# Patient Record
Sex: MALE | Race: WHITE | URBAN - METROPOLITAN AREA
[De-identification: names, ages, dates, MRNs, and addresses within clinical notes are randomized per-mention and may not be internally consistent; named-entity substitution may affect disease eponyms.]

---

## 2020-07-16 ENCOUNTER — OUTSIDE SERVICES (OUTPATIENT)
Dept: NEUROLOGY | Age: 60
End: 2020-07-16
Payer: COMMERCIAL

## 2020-07-16 PROCEDURE — 95913 NRV CNDJ TEST 13/> STUDIES: CPT | Performed by: PSYCHIATRY & NEUROLOGY

## 2020-07-16 PROCEDURE — 95886 MUSC TEST DONE W/N TEST COMP: CPT | Performed by: PSYCHIATRY & NEUROLOGY

## 2023-01-04 ENCOUNTER — APPOINTMENT (OUTPATIENT)
Dept: GENERAL RADIOLOGY | Age: 63
End: 2023-01-04
Payer: COMMERCIAL

## 2023-01-04 ENCOUNTER — HOSPITAL ENCOUNTER (EMERGENCY)
Age: 63
Discharge: HOME | End: 2023-01-04
Payer: COMMERCIAL

## 2023-01-04 VITALS
RESPIRATION RATE: 18 BRPM | DIASTOLIC BLOOD PRESSURE: 78 MMHG | SYSTOLIC BLOOD PRESSURE: 145 MMHG | HEART RATE: 84 BPM | HEIGHT: 73 IN | OXYGEN SATURATION: 98 % | WEIGHT: 265 LBS | TEMPERATURE: 97.9 F | BODY MASS INDEX: 35.12 KG/M2

## 2023-01-04 DIAGNOSIS — S86.819A STRAIN OF CALF MUSCLE, INITIAL ENCOUNTER: Primary | ICD-10-CM

## 2023-01-04 PROCEDURE — 73560 X-RAY EXAM OF KNEE 1 OR 2: CPT

## 2023-01-04 PROCEDURE — 99283 EMERGENCY DEPT VISIT LOW MDM: CPT

## 2023-01-04 PROCEDURE — 73590 X-RAY EXAM OF LOWER LEG: CPT

## 2023-01-04 ASSESSMENT — PAIN - FUNCTIONAL ASSESSMENT
PAIN_FUNCTIONAL_ASSESSMENT: 0-10
PAIN_FUNCTIONAL_ASSESSMENT: 0-10

## 2023-01-04 ASSESSMENT — ENCOUNTER SYMPTOMS
COUGH: 0
NAUSEA: 0
DIARRHEA: 0
SHORTNESS OF BREATH: 0
VOMITING: 0
ABDOMINAL PAIN: 0
BACK PAIN: 0

## 2023-01-04 ASSESSMENT — PAIN DESCRIPTION - PAIN TYPE: TYPE: ACUTE PAIN

## 2023-01-04 ASSESSMENT — PAIN DESCRIPTION - DESCRIPTORS: DESCRIPTORS: SHARP

## 2023-01-04 ASSESSMENT — PAIN SCALES - GENERAL
PAINLEVEL_OUTOF10: 6
PAINLEVEL_OUTOF10: 6

## 2023-01-04 ASSESSMENT — PAIN DESCRIPTION - ORIENTATION: ORIENTATION: LEFT

## 2023-01-04 ASSESSMENT — PAIN DESCRIPTION - FREQUENCY: FREQUENCY: CONTINUOUS

## 2023-01-04 ASSESSMENT — PAIN DESCRIPTION - LOCATION: LOCATION: LEG

## 2023-01-05 NOTE — ED PROVIDER NOTES
[address] [facility] ED  eMERGENCY dEPARTMENT eNCOUnter      Pt Name: Lauren Parks  MRN: [id number]  [place] 1960  Date of evaluation: 1/4/2023  Provider: ERIC Mantilla CNP      HISTORY OF PRESENT ILLNESS    Lauren Parks is a 60 y.o. male who presents to the Emergency Department with L calf pain x 1 day ago. He was pushing a very heavy pallet and felt a \"pop\" in the back of the L calf causing him to fall onto the L knee. Pain is moderate. Pain is worse with movement. Patient is able to ambulate with pain. REVIEW OF SYSTEMS       Review of Systems   Constitutional:  Negative for activity change, appetite change and fever. HENT:  Negative for congestion. Respiratory:  Negative for cough and shortness of breath. Cardiovascular:  Negative for chest pain. Gastrointestinal:  Negative for abdominal pain, diarrhea, nausea and vomiting. Genitourinary:  Negative for dysuria. Musculoskeletal:  Negative for arthralgias and back pain. L calf pain   Skin:  Negative for rash. All other systems reviewed and are negative. PAST MEDICAL HISTORY   No past medical history on file. SURGICAL HISTORY     No past surgical history on file. CURRENT MEDICATIONS       Previous Medications    No medications on file       ALLERGIES     Nsaids    FAMILY HISTORY     No family history on file. SOCIAL HISTORY       Social History     Socioeconomic History    Marital status:        SCREENINGS    Delisa Coma Scale  Eye Opening: Spontaneous  Best Verbal Response: Oriented  Best Motor Response: Obeys commands  Delisa Coma Scale Score: 15 @FLOW([id number])@      PHYSICAL EXAM    (up to 7 for level 4, 8 or more for level 5)     ED Triage Vitals [01/04/23 1902]   BP Temp Temp Source Heart Rate Resp SpO2 Height Weight   (!) 164/91 97.9 °F (36.6 °C) Oral 89 18 98 % 6' 1\" (1.854 m) 265 lb (120.2 kg)       Physical Exam  Vitals and nursing note reviewed.    Constitutional: Appearance: He is well-developed. HENT:      Head: Normocephalic and atraumatic. Right Ear: Hearing, tympanic membrane, ear canal and external ear normal.      Left Ear: Hearing, tympanic membrane, ear canal and external ear normal.      Nose: Nose normal.      Mouth/Throat:      Lips: Pink. Mouth: Mucous membranes are moist.   Eyes:      Conjunctiva/sclera: Conjunctivae normal.      Pupils: Pupils are equal, round, and reactive to light. Cardiovascular:      Rate and Rhythm: Normal rate and regular rhythm. Heart sounds: Normal heart sounds. Pulmonary:      Effort: Pulmonary effort is normal. No accessory muscle usage or respiratory distress. Breath sounds: Normal breath sounds. No decreased air movement. No decreased breath sounds, wheezing or rhonchi. Abdominal:      General: Bowel sounds are normal. There is no distension. Palpations: Abdomen is soft. Tenderness: There is no abdominal tenderness. Musculoskeletal:         General: Normal range of motion. Cervical back: Normal range of motion and neck supple. Left lower leg: Tenderness present. No swelling, deformity, lacerations or bony tenderness. No edema. Left ankle:      Left Achilles Tendon: Normal. No tenderness or defects. Bennett's test negative. Legs:    Skin:     General: Skin is warm and dry. Neurological:      General: No focal deficit present. Mental Status: He is alert and oriented to person, place, and time. GCS: GCS eye subscore is 4. GCS verbal subscore is 5. GCS motor subscore is 6. Deep Tendon Reflexes: Reflexes are normal and symmetric. Psychiatric:         Judgment: Judgment normal.         All other labs were within normal range or not returned as of this dictation.     EMERGENCY DEPARTMENT COURSE and DIFFERENTIALDIAGNOSIS/MDM:   Vitals:    Vitals:    01/04/23 1902   BP: (!) 164/91   Pulse: 89   Resp: 18   Temp: 97.9 °F (36.6 °C)   TempSrc: Oral   SpO2: 98% Weight: 265 lb (120.2 kg)   Height: 6' 1\" (1.854 m)            60 yr old male with calf strain. F/U  with occupational health and ortho in 1-2 days. Patient had walking boot applied for comfort during waling. Patient verbalizes understanding. PROCEDURES:  Unless otherwise noted below, none     Procedures      FINAL IMPRESSION      1.  Strain of calf muscle, initial encounter          DISPOSITION/PLAN   DISPOSITION Decision To Discharge 01/04/2023 08:05:47 PM          ERIC Richardson CNP (electronically signed)  Attending Emergency Physician      ERIC Richardson CNP  01/04/23 2007

## 2023-03-15 ENCOUNTER — TELEPHONE (OUTPATIENT)
Dept: PRIMARY CARE | Facility: CLINIC | Age: 63
End: 2023-03-15
Payer: COMMERCIAL

## 2023-03-15 RX ORDER — TAMSULOSIN HYDROCHLORIDE 0.4 MG/1
0.4 CAPSULE ORAL NIGHTLY
COMMUNITY
End: 2024-05-03

## 2023-03-15 RX ORDER — ATORVASTATIN CALCIUM 80 MG/1
80 TABLET, FILM COATED ORAL DAILY
COMMUNITY
End: 2023-11-22 | Stop reason: SDUPTHER

## 2023-03-15 RX ORDER — METFORMIN HYDROCHLORIDE 500 MG/1
500 TABLET, EXTENDED RELEASE ORAL DAILY
COMMUNITY
End: 2023-11-24

## 2023-03-15 RX ORDER — LANOLIN ALCOHOL/MO/W.PET/CERES
1 CREAM (GRAM) TOPICAL DAILY
COMMUNITY

## 2023-03-15 RX ORDER — SILDENAFIL CITRATE 20 MG/1
TABLET ORAL
COMMUNITY

## 2023-03-15 RX ORDER — CHOLECALCIFEROL (VITAMIN D3) 25 MCG
2000 TABLET ORAL DAILY
COMMUNITY
Start: 2020-02-26

## 2023-03-15 RX ORDER — VERAPAMIL HYDROCHLORIDE 120 MG/1
120 TABLET, FILM COATED, EXTENDED RELEASE ORAL
COMMUNITY
End: 2024-01-08 | Stop reason: SDUPTHER

## 2023-03-15 RX ORDER — SEMAGLUTIDE 1.34 MG/ML
0.5 INJECTION, SOLUTION SUBCUTANEOUS
COMMUNITY
Start: 2019-02-08 | End: 2024-01-11 | Stop reason: SDUPTHER

## 2023-03-15 NOTE — TELEPHONE ENCOUNTER
Steven from [facility] pharmacy called and stated they do not have ozempic 2mg/1.5 mL. They want to replace it for ozempic 2mg/3mL please advise

## 2023-03-17 LAB
ACTIVATED PARTIAL THROMBOPLASTIN TIME IN PPP BY COAGULATION ASSAY: 31 SEC (ref 26–39)
ALANINE AMINOTRANSFERASE (SGPT) (U/L) IN SER/PLAS: 15 U/L (ref 10–52)
ALBUMIN (G/DL) IN SER/PLAS: 4.2 G/DL (ref 3.4–5)
ALKALINE PHOSPHATASE (U/L) IN SER/PLAS: 92 U/L (ref 33–136)
ANION GAP IN SER/PLAS: 13 MMOL/L (ref 10–20)
ASPARTATE AMINOTRANSFERASE (SGOT) (U/L) IN SER/PLAS: 18 U/L (ref 9–39)
BASOPHILS (10*3/UL) IN BLOOD BY AUTOMATED COUNT: 0.05 X10E9/L (ref 0–0.1)
BASOPHILS/100 LEUKOCYTES IN BLOOD BY AUTOMATED COUNT: 0.8 % (ref 0–2)
BILIRUBIN TOTAL (MG/DL) IN SER/PLAS: 1.1 MG/DL (ref 0–1.2)
CALCIUM (MG/DL) IN SER/PLAS: 9.1 MG/DL (ref 8.6–10.3)
CARBON DIOXIDE, TOTAL (MMOL/L) IN SER/PLAS: 24 MMOL/L (ref 21–32)
CHLORIDE (MMOL/L) IN SER/PLAS: 104 MMOL/L (ref 98–107)
CREATININE (MG/DL) IN SER/PLAS: 1.03 MG/DL (ref 0.5–1.3)
EOSINOPHILS (10*3/UL) IN BLOOD BY AUTOMATED COUNT: 0.11 X10E9/L (ref 0–0.7)
EOSINOPHILS/100 LEUKOCYTES IN BLOOD BY AUTOMATED COUNT: 1.7 % (ref 0–6)
ERYTHROCYTE DISTRIBUTION WIDTH (RATIO) BY AUTOMATED COUNT: 13 % (ref 11.5–14.5)
ERYTHROCYTE MEAN CORPUSCULAR HEMOGLOBIN CONCENTRATION (G/DL) BY AUTOMATED: 34 G/DL (ref 32–36)
ERYTHROCYTE MEAN CORPUSCULAR VOLUME (FL) BY AUTOMATED COUNT: 86 FL (ref 80–100)
ERYTHROCYTES (10*6/UL) IN BLOOD BY AUTOMATED COUNT: 4.95 X10E12/L (ref 4.5–5.9)
GFR MALE: 81 ML/MIN/1.73M2
GLUCOSE (MG/DL) IN SER/PLAS: 238 MG/DL (ref 74–99)
HEMATOCRIT (%) IN BLOOD BY AUTOMATED COUNT: 42.7 % (ref 41–52)
HEMOGLOBIN (G/DL) IN BLOOD: 14.5 G/DL (ref 13.5–17.5)
IMMATURE GRANULOCYTES/100 LEUKOCYTES IN BLOOD BY AUTOMATED COUNT: 0.8 % (ref 0–0.9)
INR IN PPP BY COAGULATION ASSAY: 1.2 (ref 0.9–1.1)
LEUKOCYTES (10*3/UL) IN BLOOD BY AUTOMATED COUNT: 6.5 X10E9/L (ref 4.4–11.3)
LYMPHOCYTES (10*3/UL) IN BLOOD BY AUTOMATED COUNT: 1.42 X10E9/L (ref 1.2–4.8)
LYMPHOCYTES/100 LEUKOCYTES IN BLOOD BY AUTOMATED COUNT: 21.8 % (ref 13–44)
MONOCYTES (10*3/UL) IN BLOOD BY AUTOMATED COUNT: 0.48 X10E9/L (ref 0.1–1)
MONOCYTES/100 LEUKOCYTES IN BLOOD BY AUTOMATED COUNT: 7.4 % (ref 2–10)
NEUTROPHILS (10*3/UL) IN BLOOD BY AUTOMATED COUNT: 4.4 X10E9/L (ref 1.2–7.7)
NEUTROPHILS/100 LEUKOCYTES IN BLOOD BY AUTOMATED COUNT: 67.5 % (ref 40–80)
NRBC (PER 100 WBCS) BY AUTOMATED COUNT: 0 /100 WBC (ref 0–0)
PLATELETS (10*3/UL) IN BLOOD AUTOMATED COUNT: 205 X10E9/L (ref 150–450)
POTASSIUM (MMOL/L) IN SER/PLAS: 4.3 MMOL/L (ref 3.5–5.3)
PROTEIN TOTAL: 6.6 G/DL (ref 6.4–8.2)
PROTHROMBIN TIME (PT) IN PPP BY COAGULATION ASSAY: 13.4 SEC (ref 9.8–13.4)
SODIUM (MMOL/L) IN SER/PLAS: 137 MMOL/L (ref 136–145)
UREA NITROGEN (MG/DL) IN SER/PLAS: 12 MG/DL (ref 6–23)

## 2023-03-31 ENCOUNTER — HOSPITAL ENCOUNTER (OUTPATIENT)
Dept: DATA CONVERSION | Facility: HOSPITAL | Age: 63
End: 2023-03-31
Attending: ORTHOPAEDIC SURGERY | Admitting: ORTHOPAEDIC SURGERY
Payer: COMMERCIAL

## 2023-03-31 DIAGNOSIS — S83.242A OTHER TEAR OF MEDIAL MENISCUS, CURRENT INJURY, LEFT KNEE, INITIAL ENCOUNTER: ICD-10-CM

## 2023-03-31 DIAGNOSIS — I25.10 ATHEROSCLEROTIC HEART DISEASE OF NATIVE CORONARY ARTERY WITHOUT ANGINA PECTORIS: ICD-10-CM

## 2023-03-31 DIAGNOSIS — M71.38 OTHER BURSAL CYST, OTHER SITE: ICD-10-CM

## 2023-03-31 DIAGNOSIS — I10 ESSENTIAL (PRIMARY) HYPERTENSION: ICD-10-CM

## 2023-03-31 DIAGNOSIS — M67.462 GANGLION, LEFT KNEE: ICD-10-CM

## 2023-03-31 DIAGNOSIS — Z98.61 CORONARY ANGIOPLASTY STATUS: ICD-10-CM

## 2023-03-31 DIAGNOSIS — M23.92 UNSPECIFIED INTERNAL DERANGEMENT OF LEFT KNEE: ICD-10-CM

## 2023-03-31 DIAGNOSIS — Z79.84 LONG TERM (CURRENT) USE OF ORAL HYPOGLYCEMIC DRUGS: ICD-10-CM

## 2023-03-31 DIAGNOSIS — E78.5 HYPERLIPIDEMIA, UNSPECIFIED: ICD-10-CM

## 2023-03-31 DIAGNOSIS — E11.9 TYPE 2 DIABETES MELLITUS WITHOUT COMPLICATIONS (MULTI): ICD-10-CM

## 2023-03-31 DIAGNOSIS — Z87.891 PERSONAL HISTORY OF NICOTINE DEPENDENCE: ICD-10-CM

## 2023-03-31 DIAGNOSIS — M94.262 CHONDROMALACIA, LEFT KNEE: ICD-10-CM

## 2023-03-31 DIAGNOSIS — Z79.82 LONG TERM (CURRENT) USE OF ASPIRIN: ICD-10-CM

## 2023-03-31 LAB — POCT GLUCOSE: 172 MG/DL (ref 74–99)

## 2023-06-19 DIAGNOSIS — E11.69 TYPE 2 DIABETES MELLITUS WITH OTHER SPECIFIED COMPLICATION, WITHOUT LONG-TERM CURRENT USE OF INSULIN (MULTI): Primary | ICD-10-CM

## 2023-06-19 PROBLEM — M25.511 SHOULDER PAIN, RIGHT: Status: ACTIVE | Noted: 2023-06-19

## 2023-06-19 PROBLEM — M54.41 ACUTE BILATERAL LOW BACK PAIN WITH BILATERAL SCIATICA: Status: ACTIVE | Noted: 2023-06-19

## 2023-06-19 PROBLEM — M77.8 TENDONITIS OF ELBOW, LEFT: Status: ACTIVE | Noted: 2023-06-19

## 2023-06-19 PROBLEM — E78.5 HYPERLIPIDEMIA: Status: ACTIVE | Noted: 2023-06-19

## 2023-06-19 PROBLEM — M54.9 BACK PAIN WITHOUT RADICULOPATHY: Status: ACTIVE | Noted: 2023-06-19

## 2023-06-19 PROBLEM — R20.0 ARM NUMBNESS: Status: ACTIVE | Noted: 2023-06-19

## 2023-06-19 PROBLEM — R07.89 ATYPICAL CHEST PAIN: Status: ACTIVE | Noted: 2023-06-19

## 2023-06-19 PROBLEM — G56.02 CARPAL TUNNEL SYNDROME OF LEFT WRIST: Status: ACTIVE | Noted: 2023-06-19

## 2023-06-19 PROBLEM — M79.669 PAIN IN SHIN: Status: ACTIVE | Noted: 2023-06-19

## 2023-06-19 PROBLEM — R06.02 SHORTNESS OF BREATH: Status: ACTIVE | Noted: 2023-06-19

## 2023-06-19 PROBLEM — E11.9 TYPE 2 DIABETES MELLITUS (MULTI): Status: ACTIVE | Noted: 2023-06-19

## 2023-06-19 PROBLEM — G44.009 CLUSTER HEADACHES: Status: ACTIVE | Noted: 2023-06-19

## 2023-06-19 PROBLEM — S86.119A STRAIN OF GASTROCNEMIUS MUSCLE: Status: ACTIVE | Noted: 2023-06-19

## 2023-06-19 PROBLEM — R94.01 EEG ABNORMAL: Status: ACTIVE | Noted: 2023-06-19

## 2023-06-19 PROBLEM — Z98.890 STATUS POST MENISCECTOMY: Status: ACTIVE | Noted: 2023-06-19

## 2023-06-19 PROBLEM — I25.10 CAD S/P PERCUTANEOUS CORONARY ANGIOPLASTY: Status: ACTIVE | Noted: 2023-06-19

## 2023-06-19 PROBLEM — N52.9 ERECTILE DYSFUNCTION: Status: ACTIVE | Noted: 2023-06-19

## 2023-06-19 PROBLEM — T24.209A: Status: ACTIVE | Noted: 2023-06-19

## 2023-06-19 PROBLEM — I10 HYPERTENSION: Status: ACTIVE | Noted: 2023-06-19

## 2023-06-19 PROBLEM — I25.10 ATHEROSCLEROTIC HEART DISEASE OF NATIVE CORONARY ARTERY WITHOUT ANGINA PECTORIS: Status: ACTIVE | Noted: 2023-06-19

## 2023-06-19 PROBLEM — R42 DIZZINESS: Status: ACTIVE | Noted: 2023-06-19

## 2023-06-19 PROBLEM — S80.00XA KNEE CONTUSION: Status: ACTIVE | Noted: 2023-06-19

## 2023-06-19 PROBLEM — M65.30 TRIGGER FINGER: Status: ACTIVE | Noted: 2023-06-19

## 2023-06-19 PROBLEM — B35.1 ONYCHOMYCOSIS OF TOENAIL: Status: ACTIVE | Noted: 2023-06-19

## 2023-06-19 PROBLEM — S86.112D: Status: ACTIVE | Noted: 2023-06-19

## 2023-06-19 PROBLEM — M54.42 ACUTE BILATERAL LOW BACK PAIN WITH BILATERAL SCIATICA: Status: ACTIVE | Noted: 2023-06-19

## 2023-06-19 PROBLEM — K21.9 ESOPHAGEAL REFLUX: Status: ACTIVE | Noted: 2023-06-19

## 2023-06-19 PROBLEM — Z98.61 CAD S/P PERCUTANEOUS CORONARY ANGIOPLASTY: Status: ACTIVE | Noted: 2023-06-19

## 2023-06-19 PROBLEM — L82.1 SEBORRHEIC KERATOSES: Status: ACTIVE | Noted: 2023-06-19

## 2023-06-19 PROBLEM — G56.22 CUBITAL TUNNEL SYNDROME ON LEFT: Status: ACTIVE | Noted: 2023-06-19

## 2023-06-19 PROBLEM — M25.529 ELBOW PAIN: Status: ACTIVE | Noted: 2023-06-19

## 2023-06-19 PROBLEM — R41.3 MEMORY CHANGES: Status: ACTIVE | Noted: 2023-06-19

## 2023-06-19 PROBLEM — S83.242D ACUTE MEDIAL MENISCAL TEAR, LEFT, SUBSEQUENT ENCOUNTER: Status: ACTIVE | Noted: 2023-06-19

## 2023-06-19 PROBLEM — S83.90XA KNEE SPRAIN: Status: ACTIVE | Noted: 2023-06-19

## 2023-06-19 PROBLEM — E55.9 VITAMIN D DEFICIENCY: Status: ACTIVE | Noted: 2023-06-19

## 2023-06-19 PROBLEM — U07.1 DISEASE DUE TO SEVERE ACUTE RESPIRATORY SYNDROME CORONAVIRUS 2 (SARS-COV-2): Status: ACTIVE | Noted: 2023-06-19

## 2023-06-20 RX ORDER — SEMAGLUTIDE 0.68 MG/ML
INJECTION, SOLUTION SUBCUTANEOUS
Qty: 3 ML | Refills: 3 | Status: SHIPPED | OUTPATIENT
Start: 2023-06-20 | End: 2023-10-20

## 2023-08-15 ENCOUNTER — APPOINTMENT (OUTPATIENT)
Dept: PRIMARY CARE | Facility: CLINIC | Age: 63
End: 2023-08-15
Payer: COMMERCIAL

## 2023-09-14 NOTE — H&P
History & Physical Reviewed:   I have reviewed the History and Physical dated:  24-Mar-2023   History and Physical reviewed and relevant findings noted. Patient examined to review pertinent physical  findings.: No significant changes   Home Medications Reviewed: no changes noted   Allergies Reviewed: no changes noted     Consent:   COVID-19 Consent:  ·  COVID-19 Risk Consent Surgeon has reviewed key risks related to the risk of julia COVID-19 and if they contract COVID-19 what the risks are.       Electronic Signatures:  Doyle Bullock)  (Signed 31-Mar-2023 07:09)   Authored: History & Physical Reviewed, Consent, Note  Completion      Last Updated: 31-Mar-2023 07:09 by Doyle Bullock)

## 2023-09-15 ENCOUNTER — DOCUMENTATION (OUTPATIENT)
Dept: PRIMARY CARE | Facility: CLINIC | Age: 63
End: 2023-09-15
Payer: COMMERCIAL

## 2023-09-15 DIAGNOSIS — I10 PRIMARY HYPERTENSION: ICD-10-CM

## 2023-09-15 DIAGNOSIS — E78.5 HYPERLIPIDEMIA, UNSPECIFIED HYPERLIPIDEMIA TYPE: ICD-10-CM

## 2023-09-15 DIAGNOSIS — E55.9 VITAMIN D DEFICIENCY: ICD-10-CM

## 2023-09-15 DIAGNOSIS — E11.9 TYPE 2 DIABETES MELLITUS WITHOUT COMPLICATION, WITHOUT LONG-TERM CURRENT USE OF INSULIN (MULTI): Primary | ICD-10-CM

## 2023-09-15 DIAGNOSIS — Z00.00 ROUTINE HEALTH MAINTENANCE: ICD-10-CM

## 2023-09-15 DIAGNOSIS — Z13.29 SCREENING FOR THYROID DISORDER: ICD-10-CM

## 2023-09-15 DIAGNOSIS — Z12.5 SCREENING FOR PROSTATE CANCER: ICD-10-CM

## 2023-09-15 NOTE — PROGRESS NOTES
Dr Casey wanted to send friendly reminder that routine fasting labs will be due around Nov, and to be done prior to his CPE in Jan.  Can be done at any  lab location at his convenience, fasting for 10-12 hours, but plain water is acceptable.

## 2023-10-02 NOTE — OP NOTE
PROCEDURE DETAILS    Preoperative Diagnosis:  Tear of medial meniscus of left knee, current, initial encounter, S83.242A  Chondromalacia, synovial cyst.  holly-Cruciate  Postoperative Diagnosis:  Tear of medial meniscus of left knee, current, initial encounter, S83.242A  Chondromalacia, synovial cyst.  holly-Cruciate  Surgeon: Doyle Bullock  Resident/Fellow/Other Assistant: King Gonzalez    Procedure:  1. ARTHROSCOPY LEFT KNEE MEDIAL MENISCECTOMY, CHONDROPLASTY TROCHLEA / MEDIAL FEMORAL CONDYLE, LIMITED SYNOVECTOMY/ DEBRIDEMENT OF HOLLY-CRUCIATE GANGLION CYSTS    Anesthesia: General + local   Estimated Blood Loss: 5 ml   Findings: see op report         Operative Report:   Operative findings: (Outerbridge classification 0-4)   Patella: 2  Trochlea: 3  Medial compartment: 2  Lateral compartment: 1    Operative Indications:  Patient was noted to have internal derangement of the knee refractory to non-surgical modalities.  We discussed associated interventions and the risks of the surgery, including DVT/PE, infection, stiffness, medical complications,  and incomplete relief of pre-operative symptoms.    Implants:  None    Operative Procedure:  The patient was met prior to surgery and the appropriate extremity was marked.  We reviewed recent health history and found no contraindication to proceeding with the planned procedure.  The patient was transported to the operating room and underwent  general anesthesia.  The patient was positioned supine on the operative table with all teagan prominences well-padded. A sequential compression device was placed on the contralateral leg.  A non-sterile thigh tourniquet was placed and a padded lateral  thigh post.    The leg was prepped and draped in the usual sterile fashion.  A timeout was completed and antibiotic administration was in accordance with standard protocol.  The leg was exsanguinated using an Esmarch bandage and the tourniquet was inflated.  The superficial   landmarks of the knee were palpated and anteromedial and anterolateral portals were created.    A diagnostic arthroscopy was performed utilizing a fluid pump with sterile saline.  The articular surface of the patella, trochlea, medial and lateral femoral condyles and tibial plateaus were evaluated.  The Outerbridge classifications are listed above.   The gutters were evaluated and no loose bodies were noted. The medial compartment was entered with valgus stress in a slightly flexed knee against the lateral post.  The assistant helped with this to facilitate visualization.  The meniscus was probed  superiorly and inferiorly using a blunt probe.  The notch was inspected and the ACL and PCL were healthy in appearance and had appropriate tension when probed.  The knee was then flexed into a figure of four position and the lateral compartment was entered.       The articular surface of the trochlea and medial femoral condyle had chondral wear and a component of delamination which we felt could contribute to mechanical symptoms.  A 3.5 mm aggressive plus shaver was used to debride the articular cartilage until  stable margins were obtained.    The medial meniscus was noted to have a degenerative tear of the posterior horn.   Using an aggressive plus shaver and meniscal biters, we debrided the meniscus back until we obtained healthy and stable margins.  The meniscectomy extended about 70 % to  the periphery in the affected area.  Patient was noted to have 2 approximately 2 x 1 cm large ganglion cyst anterior to the ACL and PCL which could impinge in terminal extension these were debrided using a shaver.    The medial compartment was fairly tight and the medial collateral ligament was lengthening using an 18 gauge spinal needle in an outside-in fashion. This was done in a controlled fashion and no instability was noted on physical examination after.    The knee was irrigated and lavaged to remove and loose meniscal or  chondral debris.  A second pass was made diagnostically to confirm removal of any fragments and inspect for any additional pathology.  The residual fluid was expressed from the knee.   The portals were closed using a buried 3-0 monocryl suture.  Local anesthetic was injected into the soft tissue around the portals. Sterile bandages were placed.  The patient was stable to the recovery room.    I was present and participated in the entire procedure. The Physician Assistant participated in all critical elements of the procedure under my direct supervision. The surgical incision was closed by the PA under my indirect supervision. There were no  qualified residents available to assist.    Complications: none    Specimen: none    The physician assistant was present for the entire case.  Given the nature of the procedure and disease process a skilled surgical assistant was  necessary for the case.  The assistant was necessary for retraction and helped directly facilitate completion of the surgery.  A certified scrub tech was at the back table managing instruments and supplies for the surgical procedure.  Note Recipients:   Claudia Casey MD McCoy, Brett, MD - [phone] [PREFERRED]                        Attestation:   Note Completion:  Attending Attestation I performed the procedure without a resident         Electronic Signatures:  Doyle Bullock)  (Signed 31-Mar-2023 08:09)   Authored: Post-Operative Note, Chart Review, Note Completion      Last Updated: 31-Mar-2023 08:09 by Doyle Bullock)

## 2023-10-19 DIAGNOSIS — E11.69 TYPE 2 DIABETES MELLITUS WITH OTHER SPECIFIED COMPLICATION, WITHOUT LONG-TERM CURRENT USE OF INSULIN (MULTI): ICD-10-CM

## 2023-10-20 RX ORDER — SEMAGLUTIDE 0.68 MG/ML
INJECTION, SOLUTION SUBCUTANEOUS
Qty: 3 ML | Refills: 3 | Status: SHIPPED | OUTPATIENT
Start: 2023-10-20 | End: 2024-01-08 | Stop reason: WASHOUT

## 2023-11-22 DIAGNOSIS — E78.5 HYPERLIPIDEMIA, UNSPECIFIED HYPERLIPIDEMIA TYPE: ICD-10-CM

## 2023-11-22 RX ORDER — ATORVASTATIN CALCIUM 80 MG/1
80 TABLET, FILM COATED ORAL DAILY
Qty: 90 TABLET | Refills: 3 | Status: SHIPPED | OUTPATIENT
Start: 2023-11-22 | End: 2024-01-11 | Stop reason: SDUPTHER

## 2023-11-22 NOTE — TELEPHONE ENCOUNTER
Last seen on 06/23/23 and medication renewed then with a year supply. Upcoming apt. With you on 06/26/24.

## 2023-12-18 ENCOUNTER — LAB (OUTPATIENT)
Dept: LAB | Facility: LAB | Age: 63
End: 2023-12-18
Payer: COMMERCIAL

## 2023-12-18 DIAGNOSIS — Z00.00 ROUTINE HEALTH MAINTENANCE: ICD-10-CM

## 2023-12-18 DIAGNOSIS — E55.9 VITAMIN D DEFICIENCY: ICD-10-CM

## 2023-12-18 DIAGNOSIS — Z12.5 SCREENING FOR PROSTATE CANCER: ICD-10-CM

## 2023-12-18 DIAGNOSIS — E78.5 HYPERLIPIDEMIA, UNSPECIFIED HYPERLIPIDEMIA TYPE: ICD-10-CM

## 2023-12-18 DIAGNOSIS — E11.9 TYPE 2 DIABETES MELLITUS WITHOUT COMPLICATION, WITHOUT LONG-TERM CURRENT USE OF INSULIN (MULTI): ICD-10-CM

## 2023-12-18 DIAGNOSIS — Z13.29 SCREENING FOR THYROID DISORDER: ICD-10-CM

## 2023-12-18 DIAGNOSIS — I10 PRIMARY HYPERTENSION: ICD-10-CM

## 2023-12-18 LAB
25(OH)D3 SERPL-MCNC: 31 NG/ML (ref 30–100)
ALBUMIN SERPL BCP-MCNC: 4.3 G/DL (ref 3.4–5)
ALP SERPL-CCNC: 97 U/L (ref 33–136)
ALT SERPL W P-5'-P-CCNC: 17 U/L (ref 10–52)
ANION GAP SERPL CALC-SCNC: 13 MMOL/L (ref 10–20)
AST SERPL W P-5'-P-CCNC: 27 U/L (ref 9–39)
BASOPHILS # BLD AUTO: 0.05 X10*3/UL (ref 0–0.1)
BASOPHILS NFR BLD AUTO: 0.8 %
BILIRUB SERPL-MCNC: 1.6 MG/DL (ref 0–1.2)
BUN SERPL-MCNC: 12 MG/DL (ref 6–23)
CALCIUM SERPL-MCNC: 9.2 MG/DL (ref 8.6–10.3)
CHLORIDE SERPL-SCNC: 103 MMOL/L (ref 98–107)
CHOLEST SERPL-MCNC: 121 MG/DL (ref 0–199)
CHOLESTEROL/HDL RATIO: 3.5
CO2 SERPL-SCNC: 27 MMOL/L (ref 21–32)
CREAT SERPL-MCNC: 0.91 MG/DL (ref 0.5–1.3)
EOSINOPHIL # BLD AUTO: 0.13 X10*3/UL (ref 0–0.7)
EOSINOPHIL NFR BLD AUTO: 2 %
ERYTHROCYTE [DISTWIDTH] IN BLOOD BY AUTOMATED COUNT: 13.2 % (ref 11.5–14.5)
EST. AVERAGE GLUCOSE BLD GHB EST-MCNC: 140 MG/DL
GFR SERPL CREATININE-BSD FRML MDRD: >90 ML/MIN/1.73M*2
GLUCOSE SERPL-MCNC: 154 MG/DL (ref 74–99)
HBA1C MFR BLD: 6.5 %
HCT VFR BLD AUTO: 42.8 % (ref 41–52)
HDLC SERPL-MCNC: 34.3 MG/DL
HGB BLD-MCNC: 14.4 G/DL (ref 13.5–17.5)
IMM GRANULOCYTES # BLD AUTO: 0.02 X10*3/UL (ref 0–0.7)
IMM GRANULOCYTES NFR BLD AUTO: 0.3 % (ref 0–0.9)
LDLC SERPL CALC-MCNC: 71 MG/DL
LYMPHOCYTES # BLD AUTO: 1.56 X10*3/UL (ref 1.2–4.8)
LYMPHOCYTES NFR BLD AUTO: 23.7 %
MCH RBC QN AUTO: 29.2 PG (ref 26–34)
MCHC RBC AUTO-ENTMCNC: 33.6 G/DL (ref 32–36)
MCV RBC AUTO: 87 FL (ref 80–100)
MONOCYTES # BLD AUTO: 0.56 X10*3/UL (ref 0.1–1)
MONOCYTES NFR BLD AUTO: 8.5 %
NEUTROPHILS # BLD AUTO: 4.25 X10*3/UL (ref 1.2–7.7)
NEUTROPHILS NFR BLD AUTO: 64.7 %
NON HDL CHOLESTEROL: 87 MG/DL (ref 0–149)
NRBC BLD-RTO: 0 /100 WBCS (ref 0–0)
PLATELET # BLD AUTO: 197 X10*3/UL (ref 150–450)
POTASSIUM SERPL-SCNC: 4.6 MMOL/L (ref 3.5–5.3)
PROT SERPL-MCNC: 6.8 G/DL (ref 6.4–8.2)
PSA SERPL-MCNC: 1.28 NG/ML
RBC # BLD AUTO: 4.93 X10*6/UL (ref 4.5–5.9)
SODIUM SERPL-SCNC: 138 MMOL/L (ref 136–145)
TRIGL SERPL-MCNC: 79 MG/DL (ref 0–149)
TSH SERPL-ACNC: 3.09 MIU/L (ref 0.44–3.98)
VLDL: 16 MG/DL (ref 0–40)
WBC # BLD AUTO: 6.6 X10*3/UL (ref 4.4–11.3)

## 2023-12-18 PROCEDURE — 85025 COMPLETE CBC W/AUTO DIFF WBC: CPT

## 2023-12-18 PROCEDURE — 36415 COLL VENOUS BLD VENIPUNCTURE: CPT

## 2023-12-18 PROCEDURE — 80053 COMPREHEN METABOLIC PANEL: CPT

## 2023-12-18 PROCEDURE — 84153 ASSAY OF PSA TOTAL: CPT

## 2023-12-18 PROCEDURE — 82306 VITAMIN D 25 HYDROXY: CPT

## 2023-12-18 PROCEDURE — 83036 HEMOGLOBIN GLYCOSYLATED A1C: CPT

## 2023-12-18 PROCEDURE — 80061 LIPID PANEL: CPT

## 2023-12-18 PROCEDURE — 84443 ASSAY THYROID STIM HORMONE: CPT

## 2024-01-02 DIAGNOSIS — R17 ELEVATED BILIRUBIN: ICD-10-CM

## 2024-01-08 ENCOUNTER — OFFICE VISIT (OUTPATIENT)
Dept: PRIMARY CARE | Facility: CLINIC | Age: 64
End: 2024-01-08
Payer: COMMERCIAL

## 2024-01-08 VITALS
BODY MASS INDEX: 35.92 KG/M2 | SYSTOLIC BLOOD PRESSURE: 133 MMHG | HEART RATE: 84 BPM | TEMPERATURE: 97.5 F | OXYGEN SATURATION: 97 % | HEIGHT: 73 IN | DIASTOLIC BLOOD PRESSURE: 77 MMHG | WEIGHT: 271 LBS

## 2024-01-08 DIAGNOSIS — E11.9 TYPE 2 DIABETES MELLITUS WITHOUT COMPLICATION, WITHOUT LONG-TERM CURRENT USE OF INSULIN (MULTI): ICD-10-CM

## 2024-01-08 DIAGNOSIS — Z00.00 WELL ADULT EXAM: Primary | ICD-10-CM

## 2024-01-08 DIAGNOSIS — E55.9 VITAMIN D DEFICIENCY: ICD-10-CM

## 2024-01-08 DIAGNOSIS — E78.2 MIXED HYPERLIPIDEMIA: ICD-10-CM

## 2024-01-08 DIAGNOSIS — I10 PRIMARY HYPERTENSION: ICD-10-CM

## 2024-01-08 DIAGNOSIS — G25.2 INTENTION TREMOR: ICD-10-CM

## 2024-01-08 DIAGNOSIS — M25.552 BILATERAL HIP PAIN: ICD-10-CM

## 2024-01-08 DIAGNOSIS — L82.1 SEBORRHEIC KERATOSES: ICD-10-CM

## 2024-01-08 DIAGNOSIS — M25.551 BILATERAL HIP PAIN: ICD-10-CM

## 2024-01-08 PROBLEM — Z98.62 STATUS POST ANGIOPLASTY: Status: ACTIVE | Noted: 2024-01-08

## 2024-01-08 PROBLEM — E66.01 SEVERE OBESITY (MULTI): Status: ACTIVE | Noted: 2024-01-08

## 2024-01-08 PROBLEM — M94.262 CHONDROMALACIA OF LEFT KNEE: Status: ACTIVE | Noted: 2023-03-31

## 2024-01-08 PROBLEM — S63.601A SPRAIN OF RIGHT THUMB: Status: ACTIVE | Noted: 2019-03-29

## 2024-01-08 PROCEDURE — 3078F DIAST BP <80 MM HG: CPT | Performed by: INTERNAL MEDICINE

## 2024-01-08 PROCEDURE — 3075F SYST BP GE 130 - 139MM HG: CPT | Performed by: INTERNAL MEDICINE

## 2024-01-08 PROCEDURE — 99396 PREV VISIT EST AGE 40-64: CPT | Performed by: INTERNAL MEDICINE

## 2024-01-08 PROCEDURE — 1036F TOBACCO NON-USER: CPT | Performed by: INTERNAL MEDICINE

## 2024-01-08 PROCEDURE — 99214 OFFICE O/P EST MOD 30 MIN: CPT | Performed by: INTERNAL MEDICINE

## 2024-01-08 RX ORDER — VERAPAMIL HYDROCHLORIDE 120 MG/1
120 TABLET, FILM COATED, EXTENDED RELEASE ORAL
Qty: 90 TABLET | Refills: 3 | Status: SHIPPED | OUTPATIENT
Start: 2024-01-08

## 2024-01-08 RX ORDER — ASPIRIN 81 MG/1
81 TABLET ORAL DAILY
COMMUNITY

## 2024-01-08 ASSESSMENT — PATIENT HEALTH QUESTIONNAIRE - PHQ9
SUM OF ALL RESPONSES TO PHQ9 QUESTIONS 1 AND 2: 0
2. FEELING DOWN, DEPRESSED OR HOPELESS: NOT AT ALL
1. LITTLE INTEREST OR PLEASURE IN DOING THINGS: NOT AT ALL

## 2024-01-08 NOTE — PROGRESS NOTES
"Subjective       Current Issues:  Current concerns include none.  Sleep: all night  No bowel or bladder issues  No cp or sob or depression  No abd pain  Occ hips ache  Oc left back pain  6 hours 4-6 hours of sleep   Stress test in June  Rare intention tremor   June fell off ramp on r side on   Limped for months  Now achy    Review of Nutrition:  Current diet: same  Exercise discussed planning on joining a gym    Gen:  no fever  HEENT:  no trouble swallowing  CV:  no dyspnea, cyanosis  Lungs:  no shortness of breath  GI:  no constipation, no blood in stool  Vascular:  no edema  Neuro:   no weakness  Skin:  no rash  MS:no joint swelling  Gu:  no urinary complaints  All other systems have been reviewed and are negative for complaint      Screening Questions:  Objective   /77   Pulse 84   Temp 36.4 °C (97.5 °F) (Temporal)   Ht 1.854 m (6' 1\")   Wt 123 kg (271 lb)   SpO2 97%   BMI 35.75 kg/m²       General:   alert and oriented, in no acute distress   Gait:   normal   Skin:   normal   Oral cavity:   lips, mucosa, and tongue normal; teeth and gums normal   Eyes:   sclerae white, pupils equal and reactive   Ears:   normal bilaterally Tms grey   Neck:   no adenopathy and thyroid not enlarged, symmetric, no tenderness/mass/nodules   Lungs:  clear to auscultation bilaterally   Heart:   regular rate and rhythm, S1, S2 normal, no murmur, click, rub or gallop   Abdomen:  soft, non-tender; bowel sounds normal; no masses, no organomegaly   : Ne pt declines        Extremities:  extremities normal, warm and well-perfused; no cyanosis, clubbing, or edema,   Neuro:  normal without focal findings and muscle tone and strength normal and symmetric    Declines urology ref  Hips full rom bl   Problem List Items Addressed This Visit       Hypertension    Relevant Medications    verapamil SR (Calan-SR) 120 mg ER tablet    Hyperlipidemia    Seborrheic keratoses    Relevant Orders    Referral to Dermatology    Type 2 " diabetes mellitus (CMS/HCC)    Vitamin D deficiency     Other Visit Diagnoses       Well adult exam    -  Primary    Intention tremor        Bilateral hip pain        Relevant Orders    XR hip right with pelvis when performed 2 or 3 views    XR hip left with pelvis when performed 2 or 3 views            Check liver us for inc bili, likely gilberts syndrome     Vaccines per orders discussed flu vaccine, pt to consider   Chronic conditions reviewed in the assessment and plan.    Continue medications unless specified otherwise.  Previous labs reviewed.    Fu in one year for well care and as otherwise stated in wrap up plans.   Will go to the gym      Mohs Histo Method Verbiage: Each section was then chromacoded and processed in the Mohs lab using the Mohs protocol and submitted for frozen section, utilizing hematoxylin and eosin histochemical stains.

## 2024-01-11 DIAGNOSIS — I10 PRIMARY HYPERTENSION: ICD-10-CM

## 2024-01-11 DIAGNOSIS — E11.9 TYPE 2 DIABETES MELLITUS WITHOUT COMPLICATION, WITHOUT LONG-TERM CURRENT USE OF INSULIN (MULTI): ICD-10-CM

## 2024-01-11 DIAGNOSIS — E78.5 HYPERLIPIDEMIA, UNSPECIFIED HYPERLIPIDEMIA TYPE: ICD-10-CM

## 2024-01-11 RX ORDER — ATORVASTATIN CALCIUM 80 MG/1
80 TABLET, FILM COATED ORAL DAILY
Qty: 90 TABLET | Refills: 3 | Status: SHIPPED | OUTPATIENT
Start: 2024-01-11

## 2024-01-11 RX ORDER — SEMAGLUTIDE 1.34 MG/ML
0.5 INJECTION, SOLUTION SUBCUTANEOUS
Qty: 1.5 ML | Refills: 6 | Status: SHIPPED | OUTPATIENT
Start: 2024-01-11 | End: 2024-01-11 | Stop reason: WASHOUT

## 2024-01-11 NOTE — TELEPHONE ENCOUNTER
Pharmacist states 1.5ml pen is no longer available can you send over the 3ml, formatted pls double check to make sure I did correctly. Can you resend pls

## 2024-01-11 NOTE — TELEPHONE ENCOUNTER
He takes 0.50 weekly for the last 2-3 years that is his maintenance  dose that he has been on, when I roomed him yesterday it was on there twice and I deleted one as a duplicate I wonder if that is what cancelled his refills.  I apologize

## 2024-01-12 ENCOUNTER — TELEPHONE (OUTPATIENT)
Dept: CARDIOLOGY | Facility: CLINIC | Age: 64
End: 2024-01-12

## 2024-01-12 NOTE — TELEPHONE ENCOUNTER
Patient called would like to schedule his stress test per conversation from appointment on 6/23/23 needs order in system.

## 2024-01-13 ENCOUNTER — ANCILLARY PROCEDURE (OUTPATIENT)
Dept: RADIOLOGY | Facility: CLINIC | Age: 64
End: 2024-01-13
Payer: COMMERCIAL

## 2024-01-13 DIAGNOSIS — R17 ELEVATED BILIRUBIN: ICD-10-CM

## 2024-01-13 PROCEDURE — 76705 ECHO EXAM OF ABDOMEN: CPT

## 2024-01-13 PROCEDURE — 76705 ECHO EXAM OF ABDOMEN: CPT | Performed by: RADIOLOGY

## 2024-01-18 ENCOUNTER — APPOINTMENT (OUTPATIENT)
Dept: GENERAL RADIOLOGY | Age: 64
End: 2024-01-18
Payer: COMMERCIAL

## 2024-01-18 ENCOUNTER — HOSPITAL ENCOUNTER (EMERGENCY)
Age: 64
Discharge: HOME | End: 2024-01-18
Payer: COMMERCIAL

## 2024-01-18 VITALS
RESPIRATION RATE: 18 BRPM | WEIGHT: 270 LBS | BODY MASS INDEX: 35.78 KG/M2 | DIASTOLIC BLOOD PRESSURE: 95 MMHG | HEIGHT: 73 IN | SYSTOLIC BLOOD PRESSURE: 171 MMHG | HEART RATE: 83 BPM | TEMPERATURE: 98.1 F | OXYGEN SATURATION: 96 %

## 2024-01-18 DIAGNOSIS — M75.81 ROTATOR CUFF TENDINITIS, RIGHT: Primary | ICD-10-CM

## 2024-01-18 DIAGNOSIS — S63.641A SPRAIN OF ULNAR COLLATERAL LIGAMENT OF METACARPOPHALANGEAL (MCP) JOINT OF RIGHT THUMB, INITIAL ENCOUNTER: ICD-10-CM

## 2024-01-18 PROCEDURE — 73030 X-RAY EXAM OF SHOULDER: CPT

## 2024-01-18 PROCEDURE — 73130 X-RAY EXAM OF HAND: CPT

## 2024-01-18 PROCEDURE — 99283 EMERGENCY DEPT VISIT LOW MDM: CPT

## 2024-01-18 ASSESSMENT — PAIN - FUNCTIONAL ASSESSMENT
PAIN_FUNCTIONAL_ASSESSMENT: 0-10
PAIN_FUNCTIONAL_ASSESSMENT: NONE - DENIES PAIN

## 2024-01-18 ASSESSMENT — ENCOUNTER SYMPTOMS
COUGH: 0
PHOTOPHOBIA: 0
ABDOMINAL PAIN: 0
NAUSEA: 0
SHORTNESS OF BREATH: 0
WHEEZING: 0
VOMITING: 0

## 2024-01-18 ASSESSMENT — PAIN DESCRIPTION - LOCATION: LOCATION: HAND;ARM

## 2024-01-18 ASSESSMENT — LIFESTYLE VARIABLES
HOW MANY STANDARD DRINKS CONTAINING ALCOHOL DO YOU HAVE ON A TYPICAL DAY: PATIENT DOES NOT DRINK
HOW OFTEN DO YOU HAVE A DRINK CONTAINING ALCOHOL: NEVER

## 2024-01-18 ASSESSMENT — PAIN DESCRIPTION - PAIN TYPE: TYPE: ACUTE PAIN

## 2024-01-18 ASSESSMENT — PAIN DESCRIPTION - DESCRIPTORS: DESCRIPTORS: ACHING

## 2024-01-18 ASSESSMENT — PAIN DESCRIPTION - ORIENTATION: ORIENTATION: RIGHT

## 2024-01-18 ASSESSMENT — PAIN SCALES - GENERAL: PAINLEVEL_OUTOF10: 2

## 2024-01-18 NOTE — ED TRIAGE NOTES
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 Pt c/o pain in right thumb and right arm. Pt is a/o x 4 calm, skin p/w/d slight swelling noted on right thumb. No acute distress noted.

## 2024-01-18 NOTE — ED NOTES
Thumb Spica placed at this time  Pt states understanding of education of monitoring area for numbness or tingling due to being too tight and loosen if necessary

## 2024-01-19 NOTE — ED PROVIDER NOTES
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 male who presents to the emergency department for evaluation of R thumb pain. 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 Has been having pain around the base of the thumb. Some swelling that has improved. States pain with gripping or squeezing something. Pt also reporting R shoulder pain, states yesterday started to feel pain after pulling himself up into the cabin of the truck. Pain with ROM. Has not  tried anything for sx at home. No fever, chills, cp, sob, numbness tingling weakness neck or back pain.     HPI    Nursing Notes were reviewed.    REVIEW OF SYSTEMS       Review of Systems   Constitutional:  Negative for chills and fever.   HENT:  Negative for congestion.    Eyes:  Negative for photophobia.   Respiratory:  Negative for cough, shortness of breath and wheezing.    Cardiovascular:  Negative for chest pain and palpitations.   Gastrointestinal:  Negative for abdominal pain, nausea and vomiting.   Genitourinary:  Negative for dysuria, frequency and hematuria.   Musculoskeletal:  Positive for arthralgias. Negative for myalgias.   Allergic/Immunologic: Negative for immunocompromised state.   Neurological:  Negative for dizziness, weakness and headaches.   All other systems reviewed and are negative.      Except as noted above the remainder of the review of systems was reviewed and negative.       PAST MEDICAL HISTORY   No past medical history on file.      SURGICAL

## 2024-01-24 DIAGNOSIS — R17 ELEVATED BILIRUBIN: ICD-10-CM

## 2024-02-10 DIAGNOSIS — E11.9 TYPE 2 DIABETES MELLITUS WITHOUT COMPLICATION, WITHOUT LONG-TERM CURRENT USE OF INSULIN (MULTI): ICD-10-CM

## 2024-02-12 PROBLEM — S62.501A CLOSED FRACTURE OF RIGHT THUMB: Status: ACTIVE | Noted: 2019-03-29

## 2024-02-12 PROBLEM — F19.21 HISTORY OF SUBSTANCE DEPENDENCE (MULTI): Status: ACTIVE | Noted: 2023-03-31

## 2024-02-12 PROBLEM — M71.30 CYST OF BURSA: Status: ACTIVE | Noted: 2023-03-31

## 2024-02-12 PROBLEM — R51.9 HEADACHE: Status: ACTIVE | Noted: 2024-02-12

## 2024-02-12 PROBLEM — I45.9 CONDUCTION DISORDER OF THE HEART: Status: ACTIVE | Noted: 2018-11-05

## 2024-02-12 PROBLEM — M67.462 GANGLION OF LEFT KNEE: Status: ACTIVE | Noted: 2023-04-13

## 2024-02-12 RX ORDER — METFORMIN HYDROCHLORIDE 500 MG/1
500 TABLET, EXTENDED RELEASE ORAL DAILY
Qty: 90 TABLET | Refills: 3 | Status: SHIPPED | OUTPATIENT
Start: 2024-02-12

## 2024-03-31 ENCOUNTER — HOSPITAL ENCOUNTER (EMERGENCY)
Age: 64
Discharge: HOME | End: 2024-03-31
Attending: EMERGENCY MEDICINE
Payer: COMMERCIAL

## 2024-03-31 VITALS
BODY MASS INDEX: 35.78 KG/M2 | SYSTOLIC BLOOD PRESSURE: 142 MMHG | HEART RATE: 88 BPM | DIASTOLIC BLOOD PRESSURE: 90 MMHG | RESPIRATION RATE: 20 BRPM | OXYGEN SATURATION: 96 % | TEMPERATURE: 97.5 F | WEIGHT: 270 LBS | HEIGHT: 73 IN

## 2024-03-31 DIAGNOSIS — M54.31 SCIATICA OF RIGHT SIDE: ICD-10-CM

## 2024-03-31 DIAGNOSIS — S39.012A STRAIN OF LUMBAR REGION, INITIAL ENCOUNTER: Primary | ICD-10-CM

## 2024-03-31 PROCEDURE — 6370000000 HC RX 637 (ALT 250 FOR IP): Performed by: EMERGENCY MEDICINE

## 2024-03-31 PROCEDURE — 99283 EMERGENCY DEPT VISIT LOW MDM: CPT

## 2024-03-31 RX ORDER — HYDROCODONE BITARTRATE AND ACETAMINOPHEN 5; 325 MG/1; MG/1
2 TABLET ORAL ONCE
Status: COMPLETED | OUTPATIENT
Start: 2024-03-31 | End: 2024-03-31

## 2024-03-31 RX ORDER — HYDROCODONE BITARTRATE AND ACETAMINOPHEN 5; 325 MG/1; MG/1
1 TABLET ORAL EVERY 6 HOURS PRN
Qty: 12 TABLET | Refills: 0 | Status: SHIPPED | OUTPATIENT
Start: 2024-03-31 | End: 2024-04-03

## 2024-03-31 RX ORDER — CYCLOBENZAPRINE HCL 10 MG
10 TABLET ORAL 3 TIMES DAILY PRN
Qty: 21 TABLET | Refills: 0 | Status: SHIPPED | OUTPATIENT
Start: 2024-03-31 | End: 2024-04-10

## 2024-03-31 RX ORDER — CYCLOBENZAPRINE HCL 10 MG
10 TABLET ORAL ONCE
Status: COMPLETED | OUTPATIENT
Start: 2024-03-31 | End: 2024-03-31

## 2024-03-31 RX ADMIN — HYDROCODONE BITARTRATE AND ACETAMINOPHEN 2 TABLET: 5; 325 TABLET ORAL at 00:59

## 2024-03-31 RX ADMIN — CYCLOBENZAPRINE 10 MG: 10 TABLET, FILM COATED ORAL at 00:58

## 2024-03-31 ASSESSMENT — PAIN DESCRIPTION - PAIN TYPE: TYPE: ACUTE PAIN

## 2024-03-31 ASSESSMENT — PAIN DESCRIPTION - DIRECTION: RADIATING_TOWARDS: RT LEG

## 2024-03-31 ASSESSMENT — PAIN DESCRIPTION - LOCATION: LOCATION: BACK

## 2024-03-31 ASSESSMENT — PAIN - FUNCTIONAL ASSESSMENT: PAIN_FUNCTIONAL_ASSESSMENT: 0-10

## 2024-03-31 ASSESSMENT — PAIN DESCRIPTION - DESCRIPTORS: DESCRIPTORS: ACHING;SHARP

## 2024-03-31 ASSESSMENT — PAIN SCALES - GENERAL: PAINLEVEL_OUTOF10: 7

## 2024-03-31 ASSESSMENT — PAIN DESCRIPTION - ORIENTATION: ORIENTATION: LOWER

## 2024-03-31 NOTE — ED PROVIDER NOTES
[facility] ED  eMERGENCY dEPARTMENT eNCOUnter      Pt Name: Tito Howell  MRN: [id number]  Birthdate 1960  Date of evaluation: 3/31/2024  Provider: Chet Sterling MD    CHIEF COMPLAINT       Chief Complaint   Patient presents with    Back Pain     Injured yesterday while twisting, low back radiating into rt hip and rt leg         HISTORY OF PRESENT ILLNESS   (Location/Symptom, Timing/Onset,Context/Setting, Quality, Duration, Modifying Factors, Severity)  Note limiting factors.   Tito Howell is a 64 y.o. male who presents to the emergency department for evaluation of lower back pain with radiation into the right leg.  Patient states that his initial injury was 1 week ago when he was lifting 150 pound barrel felt a pain in his lower back with a lifting lasted a couple days but went away on its own.  Then he was moving a pallet yesterday twisting and he felt the pain returned and it is continued.  Now the pain is traveling down the right leg from his lower back region.  Its never been midline.  Suspect paraspinal and is again rating down the right leg to just around the knee or slightly below.  He has no numbness paresthesias in the groin area.  Has no troubles urinating no bowel bladder incontinence.  Able to ambulate without assistance.    HPI    NursingNotes were reviewed.    REVIEW OF SYSTEMS    (2-9 systems for level 4, 10 or more for level 5)     Review of Systems   Constitutional:  Negative for chills and diaphoresis.   HENT:  Negative for congestion, ear pain, mouth sores and sore throat.    Eyes:  Negative for photophobia and discharge.   Respiratory:  Negative for cough, chest tightness, shortness of breath and wheezing.    Cardiovascular:  Negative for chest pain and palpitations.   Gastrointestinal:  Negative for abdominal distention, abdominal pain, nausea and vomiting.   Endocrine: Negative for cold intolerance.   Genitourinary:  Negative for difficulty urinating.

## 2024-04-12 ENCOUNTER — HOSPITAL ENCOUNTER (OUTPATIENT)
Dept: RADIOLOGY | Facility: CLINIC | Age: 64
Discharge: HOME | End: 2024-04-12
Payer: COMMERCIAL

## 2024-04-12 DIAGNOSIS — M25.551 BILATERAL HIP PAIN: ICD-10-CM

## 2024-04-12 DIAGNOSIS — E11.9 TYPE 2 DIABETES MELLITUS WITHOUT COMPLICATION, WITHOUT LONG-TERM CURRENT USE OF INSULIN (MULTI): ICD-10-CM

## 2024-04-12 DIAGNOSIS — M25.552 BILATERAL HIP PAIN: ICD-10-CM

## 2024-04-12 DIAGNOSIS — E55.9 VITAMIN D DEFICIENCY: ICD-10-CM

## 2024-04-12 DIAGNOSIS — E78.2 MIXED HYPERLIPIDEMIA: Primary | ICD-10-CM

## 2024-04-12 PROCEDURE — 73523 X-RAY EXAM HIPS BI 5/> VIEWS: CPT

## 2024-04-12 PROCEDURE — 73523 X-RAY EXAM HIPS BI 5/> VIEWS: CPT | Mod: BILATERAL PROCEDURE | Performed by: RADIOLOGY

## 2024-04-15 ENCOUNTER — TELEPHONE (OUTPATIENT)
Dept: PRIMARY CARE | Facility: CLINIC | Age: 64
End: 2024-04-15
Payer: COMMERCIAL

## 2024-04-15 DIAGNOSIS — M25.552 BILATERAL HIP PAIN: ICD-10-CM

## 2024-04-15 DIAGNOSIS — M25.551 BILATERAL HIP PAIN: ICD-10-CM

## 2024-04-15 NOTE — TELEPHONE ENCOUNTER
----- Message from Yoselin Baumann MA sent at 4/15/2024  9:50 AM EDT -----  Patient notified and would like an appt for  anytime on Friday afternoon, referral formatted

## 2024-04-18 NOTE — PROGRESS NOTES
History of Present Illness  No chief complaint on file.      Patient presents with {side:91843} hip pain for several years.  It has been worsening over the past  {months:82108} months.  The patient localizes the pain predominantly in the {hip location:58519}.  Recently there has been concern for falls and instability.  There is increasing difficulty with activities of daily living and significant disability related to the hip pain.  The patient endorses the following failed non-operative treatments: {treatment options:83546}.   There is increasing frustration with persistent pain and discomfort and decreasing distance of ambulation.    Pain is described as {pain description:71262}.  Better with rest, worse with activity.   Pain level: {PAIN SCALE NUMBERS:78052}  Assistive device: {amb assistive device:60541}  History of surgery on the hip: ***  Back pain: {YES (DEF)/NO:98295}  Numbness or tingling radiating to the lower extremities: {YES (DEF)/NO:51043}    Past Medical History:   Diagnosis Date    Hyperlipidemia, unspecified     Dyslipidemia    Obesity, unspecified 05/25/2022    Class 2 obesity with body mass index (BMI) of 35.0 to 35.9 in adult    Other conditions influencing health status     Coronary Artery Disease    Other diseases of mediastinum, not elsewhere classified 05/02/2014    Mediastinal mass    Peripheral vascular angioplasty status 08/18/2013    Status post angioplasty    Personal history of other endocrine, nutritional and metabolic disease 08/07/2020    History of diabetes mellitus    Toxic effect of venom of brown recluse spider, accidental (unintentional), initial encounter 05/25/2022    Brown recluse spider bite       Medication Documentation Review Audit       Reviewed by Claudia Casey MD (Physician) on 01/08/24 at 0817      Medication Order Taking? Sig Documenting Provider Last Dose Status   aspirin 81 mg EC tablet [id number] Yes Take 1 tablet (81 mg) by mouth once daily. Historical  Provider, MD Taking Active     Discontinued 01/08/24 0808   atorvastatin (Lipitor) 80 mg tablet [id number] Yes Take 1 tablet (80 mg) by mouth once daily. Tracy M Schwab, APRN-CNP Taking Active   cholecalciferol (Vitamin D-3) 25 MCG (1000 UT) tablet [id number] Yes Take 1 tablet (25 mcg) by mouth once daily. Historical Provider, MD Taking Active   cyanocobalamin (Vitamin B-12) 1,000 mcg tablet [id number] Yes Take 1 tablet (1,000 mcg) by mouth once daily. Historical Provider, MD Taking Active   metFORMIN  mg 24 hr tablet [id number] Yes TAKE 1 TABLET BY MOUTH ONCE DAILY AS DIRECTED GEORGE Ferreira-CNP Taking Active   Ozempic 0.25 mg or 0.5 mg(2 mg/1.5 mL) pen injector [id number] Yes Inject 0.5 mg under the skin 1 (one) time per week. Historical Provider, MD Taking Active     Discontinued 01/08/24 0816   sildenafil (Revatio) 20 mg tablet [id number] Yes TAKE 4 TABLETS BY MOUTH 30-60 MINUTES PRIOR TO INTERCOURSE AS NEEDED. Historical Provider, MD Taking Active   tamsulosin (Flomax) 0.4 mg 24 hr capsule [id number]  Take 1 capsule (0.4 mg) by mouth once daily at bedtime. Historical Provider, MD  Active   verapamil SR (Calan-SR) 120 mg ER tablet [id number] Yes Take 1 tablet (120 mg) by mouth once daily in the morning. Take before meals. Historical Provider, MD Taking Active                    Allergies   Allergen Reactions    Canagliflozin Unknown    Nsaids (Non-Steroidal Anti-Inflammatory Drug) Unknown       Social History     Socioeconomic History    Marital status:      Spouse name: Not on file    Number of children: Not on file    Years of education: Not on file    Highest education level: Not on file   Occupational History    Not on file   Tobacco Use    Smoking status: Former     Types: Cigarettes    Smokeless tobacco: Never   Substance and Sexual Activity    Alcohol use: Yes    Drug use: Not on file    Sexual activity: Not on file   Other Topics Concern    Not on file   Social History Narrative    Not on file      Social Determinants of Health     Financial Resource Strain: Not on file   Food Insecurity: Not on file   Transportation Needs: Not on file   Physical Activity: Not on file   Stress: Not on file   Social Connections: Not on file   Intimate Partner Violence: Not on file   Housing Stability: Not on file       Past Surgical History:   Procedure Laterality Date    KNEE SURGERY  09/19/2013    Knee Surgery    OTHER SURGICAL HISTORY  07/02/2021    Colonoscopy    OTHER SURGICAL HISTORY  01/19/2021    Cardiac catheterization with stent placement    OTHER SURGICAL HISTORY  01/19/2021    Mass excision       No images are attached to the encounter.    BMI  ***       Review of Systems   GENERAL: Negative for malaise, significant weight loss, fever  MUSCULOSKELETAL: see HPI  NEURO:  Negative     Exam  {side:64866} Hip:  Antalgic gait  Negative Trendelenburg sign  Skin healthy and intact  No tenderness to palpation over lumbar spine  Minimal tenderness over greater trochanter     Range of motion:  Flexion: {hip flexion:52787} internal rotation: {hip internal rotation:63224} external rotation: {hip external rotation:60560} abduction: {hip abduction:27571}  Pain with: {exacerbating factors:520104}  No weakness with resisted hip flexion, abduction or adduction     Negative straight leg raise  Neurovascular exam normal distally     Radiographs  XR hips bilateral 5+ VW w pelvis when performed  Narrative: Interpreted By:  Justyna Cherry,   STUDY:  Single view pelvis.  Two views right hip. Two views left hip.      INDICATION:  Signs/Symptoms:pain.      COMPARISON:  None.      ACCESSION NUMBER(S):  [id number]      ORDERING CLINICIAN:  MARCY SOLIS      FINDINGS:  No acute fracture or malalignment.  Bilateral hip joint spaces are well preserved.  Small degenerative marginal osteophytes of the bilateral acetabula.  Advanced lower lumbar spine degenerative changes with disc height  loss and osteophytes. Soft tissues are within  normal limits.      Impression: 1. Advanced lower lumbar spine degenerative change.  2. Mild bilateral hip osteoarthrosis with preserved joint spaces.      MACRO:  None.      Signed by: Justyna Cherry 4/13/2024 9:12 AM  Dictation workstation:   [id number]         Assessment  Osteoarthrosis {side:51660} hip     Plan  We discussed with the patient the diagnosis of {MILD, MOD, SEV:20552} degenerative joint disease of the hip.  We reviewed an evidence-based approach to osteoarthritis of the hip.  We strongly encouraged low-impact aerobic activity and non-opioid analgesics.  We discussed the role of physical therapy to aid in strengthening and gait training.  We discussed temporary pain relief with corticosteroid injections and the associated risks.      The patient elected for ***.

## 2024-04-19 ENCOUNTER — APPOINTMENT (OUTPATIENT)
Dept: ORTHOPEDIC SURGERY | Facility: CLINIC | Age: 64
End: 2024-04-19
Payer: COMMERCIAL

## 2024-04-19 ENCOUNTER — HOSPITAL ENCOUNTER (OUTPATIENT)
Dept: CARDIOLOGY | Facility: CLINIC | Age: 64
Discharge: HOME | End: 2024-04-19
Payer: COMMERCIAL

## 2024-04-19 DIAGNOSIS — Z98.61 CORONARY ANGIOPLASTY STATUS: ICD-10-CM

## 2024-04-19 DIAGNOSIS — I25.10 ATHEROSCLEROTIC HEART DISEASE OF NATIVE CORONARY ARTERY WITHOUT ANGINA PECTORIS: ICD-10-CM

## 2024-04-19 PROCEDURE — 93016 CV STRESS TEST SUPVJ ONLY: CPT | Performed by: INTERNAL MEDICINE

## 2024-04-19 PROCEDURE — 93017 CV STRESS TEST TRACING ONLY: CPT

## 2024-04-19 PROCEDURE — 93018 CV STRESS TEST I&R ONLY: CPT | Performed by: INTERNAL MEDICINE

## 2024-05-02 DIAGNOSIS — N40.0 BPH WITHOUT OBSTRUCTION/LOWER URINARY TRACT SYMPTOMS: ICD-10-CM

## 2024-05-03 RX ORDER — TAMSULOSIN HYDROCHLORIDE 0.4 MG/1
0.4 CAPSULE ORAL NIGHTLY
Qty: 90 CAPSULE | Refills: 3 | Status: SHIPPED | OUTPATIENT
Start: 2024-05-03

## 2024-05-09 NOTE — PROGRESS NOTES
History of Present Illness   Chief Complaint   Patient presents with    Left Hip - Follow-up     Left hip pain, xrays @  4/12/2024        History of Present Illness:   The patient is a 64-year-old male presenting today with complaints of left hip pain. Had bilateral hip x-rays on 04/12/24. He backed his Darvin Cornell off the back of his truck and it fell on top of him. He states that this pain has been ongoing for 6-8 months. Has tenderness and pain in the groin area. Has a history of diabetes. Currently taking a muscle relaxer.  Remote GI bleed 20+ years ago. Was offered injections and patient declined due to not liking needles.     Imaging:  Hip/pelvis: No acute fractures or dislocations.  Shows mild arthritis.     Assessment:   Left hip pain with mild arthritis    Plan:  We reviewed the role of imaging, physical therapy, injections and the time frame to healing and correlation with outcome.    Medrol Dosepak for 5 days.   NSAID: Celebrex.  GI side effects and medical risks discussed  Ice/heat: 60 minutes on and off  Exercise home program: Medically directed hip therapy / Handout given  Discussed ultrasound-guided injections for hip arthritis.  States he is too apprehensive to consider this.  Follow-up: 4 weeks     Physical Exam:  Well-nourished, well-developed. No acute distress. Alert and oriented x3. Responds appropriately to questioning. Good mood. Normal affect.  Physical Exam  Left Hip:  Normal gait, Negative Trendelenburg sign  Skin healthy and intact  Reproducible hip pain with impingement   Internal rotation to 20  External rotation to 30  Moderate pain over the greater trochanter  Negative straight leg raise  Neurovascular exam normal distally     Review of Systems:  GENERAL: Negative for malaise, significant weight loss, fever  MUSCULOSKELETAL: See HPI  NEURO:  Negative     Past Medical History:   Diagnosis Date    Hyperlipidemia, unspecified     Dyslipidemia    Obesity, unspecified 05/25/2022    Class  2 obesity with body mass index (BMI) of 35.0 to 35.9 in adult    Other conditions influencing health status     Coronary Artery Disease    Other diseases of mediastinum, not elsewhere classified 05/02/2014    Mediastinal mass    Peripheral vascular angioplasty status 08/18/2013    Status post angioplasty    Personal history of other endocrine, nutritional and metabolic disease 08/07/2020    History of diabetes mellitus    Toxic effect of venom of brown recluse spider, accidental (unintentional), initial encounter 05/25/2022    Brown recluse spider bite       Medication Documentation Review Audit       Reviewed by Jada Butcher MA (Medical Assistant) on 05/10/24 at 1032      Medication Order Taking? Sig Documenting Provider Last Dose Status   aspirin 81 mg EC tablet [id number] No Take 1 tablet (81 mg) by mouth once daily. Historical Provider, MD Taking Active   atorvastatin (Lipitor) 80 mg tablet [id number]  Take 1 tablet (80 mg) by mouth once daily. Rubina Rodas, APRN-CNP  Active   cholecalciferol (Vitamin D-3) 25 MCG (1000 UT) tablet [id number] No Take 2 tablets (2,000 Units) by mouth once daily. Historical Provider, MD Taking Active   cyanocobalamin (Vitamin B-12) 1,000 mcg tablet [id number] No Take 1 tablet (1,000 mcg) by mouth once daily. Historical Provider, MD Taking Active   metFORMIN  mg 24 hr tablet [id number]  TAKE 1 TABLET BY MOUTH ONCE DAILY AS DIRECTED Claudia Casey MD  Active   semaglutide (OZEMPIC) 1 mg/dose (4 mg/3 mL) pen injector [id number]  Inject 1 mg under the skin 1 (one) time per week. Claudia Casey MD  Active   sildenafil (Revatio) 20 mg tablet [id number] No TAKE 4 TABLETS BY MOUTH 30-60 MINUTES PRIOR TO INTERCOURSE AS NEEDED. Historical Provider, MD Taking Active   tamsulosin (Flomax) 0.4 mg 24 hr capsule [id number]  Take 1 capsule by mouth at bedtime Claudia Casey MD  Active   verapamil SR (Calan-SR) 120 mg ER tablet [id number]  Take 1 tablet (120 mg) by mouth once  daily in the morning. Take before meals. Claudia Casey MD  Active                    Allergies   Allergen Reactions    Canagliflozin Unknown    Nsaids (Non-Steroidal Anti-Inflammatory Drug) Unknown       Social History     Socioeconomic History    Marital status:      Spouse name: Not on file    Number of children: Not on file    Years of education: Not on file    Highest education level: Not on file   Occupational History    Not on file   Tobacco Use    Smoking status: Former     Types: Cigarettes    Smokeless tobacco: Never   Substance and Sexual Activity    Alcohol use: Yes    Drug use: Not on file    Sexual activity: Not on file   Other Topics Concern    Not on file   Social History Narrative    Not on file     Social Determinants of Health     Financial Resource Strain: Not on file   Food Insecurity: Not on file   Transportation Needs: Not on file   Physical Activity: Not on file   Stress: Not on file   Social Connections: Not on file   Intimate Partner Violence: Not on file   Housing Stability: Not on file       Past Surgical History:   Procedure Laterality Date    KNEE SURGERY  09/19/2013    Knee Surgery    OTHER SURGICAL HISTORY  07/02/2021    Colonoscopy    OTHER SURGICAL HISTORY  01/19/2021    Cardiac catheterization with stent placement    OTHER SURGICAL HISTORY  01/19/2021    Mass excision       Stress Test    Result Date: 4/22/2024              [address], [address] [place] [address]          Tel [phone] Fax [phone] Exercise Stress Test Patient Name:      AURA GLEZ STACY   Ordering Provider:     [id number] TRACY M SCHWAB Study Date:        4/19/2024            Reading Physician:     [id number] Chapito Brantley MD MRN/PID:           [id number]             Supervising Physician: [id number] Karissa Marvin                                                                 MD, [facility] Accession#:        [id number]         Fellow: Date of Birth/Age: 1960 / 64 years Fellow: Gender:            M                    Nurse:                 Nicole Dupont RN,                                                                MSN Admission Status:                       Sonographer:           N/A Height:            185.42 cm            Technologist: Weight:            122.93 kg            Additional Staff:      Loretta Castillo RN BSA:               2.45 m2              Encounter#:            [id number] BMI:               35.75 kg/m2          Patient Location: Study Type:    STRESS TEST ONLY Diagnosis/ICD: Atherosclerotic heart disease of native coronary artery without                angina pectoris-I25.10; Coronary angioplasty status (PTCA)-Z98.61 Indication:    cad w/o angina, s/p PTCA Falls Risk:  Study Details: Correct procedure and correct patient verified verbally.  Patient Performance: The patient exercised to stage III on a Davey protocol for 9 minutes and 00 seconds, achieving 10.1 METS. The peak heart rate achieved was 169 bpm, which was 109 % of the age predicted target heart rate of 155 bpm. The resting blood pressure was 156/84 mmHg with a heart rate of 77 bpm. The standing blood pressure was 142/84 mmHg with a heart rate of 83 bpm. The patient developed shortness of breath and SOB KNEE DISCOMFORT during the stress exam. The blood pressure response was hypertensive. The test was terminated due to: dyspnea and KNEE DISCOMFORT.  Baseline ECG: Resting ECG showed BLOCK PAC with 1st degree AV WITH BLOCK PAC.  Stress Stage Data: +-----------------+---+------+-------+----+---------------+                  HR Sys BPDias BPRPE Comments        +-----------------+---+------+-------+----+---------------+ Baseline Resting 77 156   84                         +-----------------+---+------+-------+----+---------------+ Baseline [id number] 142    84                         +-----------------+---+------+-------+----+---------------+ Stage I          [id number]   84     3/10                +-----------------+---+------+-------+----+---------------+ Stage II         [id number]   90     4/10KNEE DISCOMFORT +-----------------+---+------+-------+----+---------------+ Stage III        [id number]   86     7/10                +-----------------+---+------+-------+----+---------------+  Recovery ECG: Recovery ECG showed normal sinus rhythm. The heart rate recovery was abnormal.  +------------+---+------+-------+-----------+             HR Sys BPDias BPComments    +------------+---+------+-------+-----------+ Recovery I  [id number]   82                 +------------+---+------+-------+-----------+ Recovery II [id number]   90                 +------------+---+------+-------+-----------+ Recovery [id number]   80     ONE TRIPLET +------------+---+------+-------+-----------+ Recovery IV [id number]   86                 +------------+---+------+-------+-----------+ Recovery V  97 188   84                 +------------+---+------+-------+-----------+  Summary:  1. Adequate level of stress achieved.  2. No clinical or electrocardiographic evidence for ischemia at maximal workload. [id number] Chapito Brantley MD Electronically signed on 4/22/2024 at 2:06:34 PM   ** Final **     XR hips bilateral 5+ VW w pelvis when performed    Result Date: 4/13/2024  Interpreted By:  Justyna Cherry, STUDY: Single view pelvis. Two views right hip. Two views left hip.   INDICATION: Signs/Symptoms:pain.   COMPARISON: None.   ACCESSION NUMBER(S): [id number]   ORDERING CLINICIAN: MARCY SOLIS   FINDINGS: No acute fracture or malalignment. Bilateral hip joint spaces are well preserved. Small degenerative marginal osteophytes of the bilateral acetabula. Advanced lower lumbar spine degenerative changes with disc height loss and osteophytes. Soft tissues are  within normal limits.       1. Advanced lower lumbar spine degenerative change. 2. Mild bilateral hip osteoarthrosis with preserved joint spaces.   MACRO: None.   Signed by: Justyna Cherry 4/13/2024 9:12 AM Dictation workstation:   [id number]                            Scribe Attestation  By signing my name below, Jennifer HARTMANN Scribe   attest that this documentation has been prepared under the direction and in the presence of Holden Chacon MD.

## 2024-05-10 ENCOUNTER — OFFICE VISIT (OUTPATIENT)
Dept: ORTHOPEDIC SURGERY | Facility: CLINIC | Age: 64
End: 2024-05-10
Payer: COMMERCIAL

## 2024-05-10 DIAGNOSIS — M25.551 BILATERAL HIP PAIN: ICD-10-CM

## 2024-05-10 DIAGNOSIS — M25.552 BILATERAL HIP PAIN: ICD-10-CM

## 2024-05-10 PROCEDURE — 1160F RVW MEDS BY RX/DR IN RCRD: CPT | Performed by: ORTHOPAEDIC SURGERY

## 2024-05-10 PROCEDURE — 99214 OFFICE O/P EST MOD 30 MIN: CPT | Performed by: ORTHOPAEDIC SURGERY

## 2024-05-10 PROCEDURE — 1159F MED LIST DOCD IN RCRD: CPT | Performed by: ORTHOPAEDIC SURGERY

## 2024-05-10 PROCEDURE — 1036F TOBACCO NON-USER: CPT | Performed by: ORTHOPAEDIC SURGERY

## 2024-05-10 RX ORDER — METHYLPREDNISOLONE 4 MG/1
4 TABLET ORAL ONCE
Qty: 1 TABLET | Refills: 0 | Status: SHIPPED | OUTPATIENT
Start: 2024-05-10 | End: 2024-05-10

## 2024-05-10 RX ORDER — CELECOXIB 200 MG/1
200 CAPSULE ORAL DAILY
Qty: 30 CAPSULE | Refills: 0 | Status: SHIPPED | OUTPATIENT
Start: 2024-05-10 | End: 2024-06-09

## 2024-06-07 ENCOUNTER — APPOINTMENT (OUTPATIENT)
Dept: ORTHOPEDIC SURGERY | Facility: CLINIC | Age: 64
End: 2024-06-07
Payer: COMMERCIAL

## 2024-06-26 ENCOUNTER — APPOINTMENT (OUTPATIENT)
Dept: CARDIOLOGY | Facility: CLINIC | Age: 64
End: 2024-06-26
Payer: COMMERCIAL

## 2024-06-26 VITALS
WEIGHT: 258.2 LBS | DIASTOLIC BLOOD PRESSURE: 68 MMHG | SYSTOLIC BLOOD PRESSURE: 120 MMHG | HEIGHT: 73 IN | OXYGEN SATURATION: 97 % | BODY MASS INDEX: 34.22 KG/M2 | RESPIRATION RATE: 18 BRPM | HEART RATE: 89 BPM

## 2024-06-26 DIAGNOSIS — E78.2 MIXED HYPERLIPIDEMIA: ICD-10-CM

## 2024-06-26 DIAGNOSIS — Z98.61 CAD S/P PERCUTANEOUS CORONARY ANGIOPLASTY: Primary | ICD-10-CM

## 2024-06-26 DIAGNOSIS — I25.10 CAD S/P PERCUTANEOUS CORONARY ANGIOPLASTY: Primary | ICD-10-CM

## 2024-06-26 DIAGNOSIS — I10 PRIMARY HYPERTENSION: ICD-10-CM

## 2024-06-26 PROCEDURE — 1160F RVW MEDS BY RX/DR IN RCRD: CPT | Performed by: NURSE PRACTITIONER

## 2024-06-26 PROCEDURE — 93000 ELECTROCARDIOGRAM COMPLETE: CPT | Performed by: NURSE PRACTITIONER

## 2024-06-26 PROCEDURE — 1159F MED LIST DOCD IN RCRD: CPT | Performed by: NURSE PRACTITIONER

## 2024-06-26 PROCEDURE — 3074F SYST BP LT 130 MM HG: CPT | Performed by: NURSE PRACTITIONER

## 2024-06-26 PROCEDURE — 3078F DIAST BP <80 MM HG: CPT | Performed by: NURSE PRACTITIONER

## 2024-06-26 PROCEDURE — 99214 OFFICE O/P EST MOD 30 MIN: CPT | Performed by: NURSE PRACTITIONER

## 2024-06-26 NOTE — PROGRESS NOTES
Name : Robel Mcneil   : 1960   MRN : [id number]   ENC Date : 2024    CC: Annual Exam, Coronary Artery Disease, Hypertension, and Hyperlipidemia     HPI:    Robel Mcneil is a 64 y.o. male with PMHx sig for CAD s/p PCI LAD & RI with AILEEN (. repeat cor angio 2014 patent stents), LVEF 60-65% (echo ), HTN, HLD, obesity & DM who presents today for annual cardiovascular follow up.     He has done very well since his last visit. Reports no major health issues and has had no hospitalizations. Denies any chest pain, pressure, SOB/CORREA, PND, orthopnea, LE edema, palpitations, lightheadedness, dizziness, or syncope at rest or with exertions. He is compliant with his medications and reports no side effects. He has been physically active.     Golfs every      He is a  for [employer]. Needs stress test every other year for his [facility]    Stress test this year was normal    CV Diagnostics:  Exercise stress test 24: Normal. No e/o ischemia nor infarct    ROS: unless otherwise noted in the history of present illness, all other systems were reviewed and they are negative for complaints     Allergies:  Canagliflozin and Nsaids (non-steroidal anti-inflammatory drug)    Current Outpatient Medications   Medication Instructions    aspirin 81 mg, oral, Daily    atorvastatin (LIPITOR) 80 mg, oral, Daily    cholecalciferol (VITAMIN D-3) 2,000 Units, oral, Daily    cyanocobalamin (Vitamin B-12) 1,000 mcg tablet 1 tablet, oral, Daily    metFORMIN XR (GLUCOPHAGE-XR) 500 mg, oral, Daily, as directed    semaglutide (OZEMPIC) 1 mg, subcutaneous, Once Weekly    sildenafil (Revatio) 20 mg tablet TAKE 4 TABLETS BY MOUTH 30-60 MINUTES PRIOR TO INTERCOURSE AS NEEDED.    tamsulosin (FLOMAX) 0.4 mg, oral, Nightly    verapamil SR (CALAN-SR) 120 mg, oral, Daily before breakfast        Last Labs:  CBC  Lab Results   Component Value Date    WBC 6.6 2023    HGB 14.4 2023    HCT 42.8 2023    MCV 87  "12/18/2023     12/18/2023       CMP  Lab Results   Component Value Date    CALCIUM 9.2 12/18/2023    PROT 6.8 12/18/2023    ALBUMIN 4.3 12/18/2023    AST 27 12/18/2023    ALT 17 12/18/2023    ALKPHOS 97 12/18/2023    BILITOT 1.6 (H) 12/18/2023       BMP   Lab Results   Component Value Date     12/18/2023    K 4.6 12/18/2023     12/18/2023    CO2 27 12/18/2023    GLUCOSE 154 (H) 12/18/2023    BUN 12 12/18/2023    CREATININE 0.91 12/18/2023       LIPID PANEL   Lab Results   Component Value Date    CHOL 121 12/18/2023    TRIG 79 12/18/2023    HDL 34.3 12/18/2023    CHHDL 3.5 12/18/2023    LDLF 41 11/15/2022    VLDL 16 12/18/2023    NHDL 87 12/18/2023       RENAL FUNCTION PANEL   Lab Results   Component Value Date    GLUCOSE 154 (H) 12/18/2023     12/18/2023    K 4.6 12/18/2023     12/18/2023    CO2 27 12/18/2023    ANIONGAP 13 12/18/2023    BUN 12 12/18/2023    CREATININE 0.91 12/18/2023    GFRMALE 81 03/17/2023    CALCIUM 9.2 12/18/2023    ALBUMIN 4.3 12/18/2023        Lab Results   Component Value Date    HGBA1C 6.5 (H) 12/18/2023     I have reviewed the above labs & diagnostics    Last Recorded Vitals:  Vitals:    06/26/24 1444   BP: 120/68   BP Location: Left arm   Patient Position: Sitting   Pulse: 89   Resp: 18   SpO2: 97%   Weight: 117 kg (258 lb 3.2 oz)   Height: 1.854 m (6' 1\")     Physical Exam:  On exam Mr. Robel Mcneil appears his stated age, is alert and oriented x3, and in no acute distress. His sclera are anicteric and his oropharynx has moist mucous membranes. His neck is supple and without thyromegaly. The JVP is ~5 cm of water above the right atrium. His cardiac exam has regular rhythm, normal S1, S2. No S3/4. There are no murmurs. His lungs are clear to auscultation bilaterally and there is no dullness to percussion. His abdomen is soft, nontender with normoactive bowel sounds. There is no HJR. The extremities are warm and without edema. The skin is dry. There is " no rash present. The distal pulses are 2-3+ in all four extremities. His mood and affect are appropriate for todays encounter.     Assessment/Plan:  1. CAD s/p PCI LAD & RI with AILEEN (2012). Repeat coronary angiography 2014 showed patent stents. LVEF 60-65% (echo 2020). 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  - c/w ASA 81mg QD  - c/w Atorvastatin 80mg QD (LFTs are normal)  - Echo for surveillance ~2025/2026     2. Hypertension. /68 mmHg. Well controlled on current regimen.     3. HLD. Lipid panel 12/2023 with LDL just outside of goal at 71. Will monitor for now & continue with high intensity statin    Follow up in 1 year or as needed   Tracy M Schwab, GEORGE-CNP

## 2024-07-13 ENCOUNTER — APPOINTMENT (OUTPATIENT)
Dept: RADIOLOGY | Facility: CLINIC | Age: 64
End: 2024-07-13
Payer: COMMERCIAL

## 2024-07-22 DIAGNOSIS — N52.9 ERECTILE DYSFUNCTION, UNSPECIFIED ERECTILE DYSFUNCTION TYPE: ICD-10-CM

## 2024-07-22 RX ORDER — SILDENAFIL CITRATE 20 MG/1
20 TABLET ORAL SEE ADMIN INSTRUCTIONS
Qty: 40 TABLET | Refills: 0 | Status: SHIPPED | OUTPATIENT
Start: 2024-07-22

## 2024-07-23 ENCOUNTER — TELEPHONE (OUTPATIENT)
Dept: PRIMARY CARE | Facility: CLINIC | Age: 64
End: 2024-07-23
Payer: COMMERCIAL

## 2024-07-23 NOTE — TELEPHONE ENCOUNTER
Elizabet from [facility] called in regarding Rx sent in for Pt by DG NP. Medication needing clarification is Sildenafil 20 mg. Per instructions sent over it states-Take 1 tablet (20 mg) by mouth see administration instructions. Pharmacy stated that for diagnosis  Pulmonary Hypertension usual dosage is TID . Pharmacy requesting a call back for clarification a daily dosage.       Please advise

## 2024-07-24 NOTE — TELEPHONE ENCOUNTER
Confirmed with wife patient takes for ED and he take 3 tablets at a time as needed, I formatted this way yesterday when she called for refill, not sure why it was changed. But I will reformat and please resend

## 2024-07-24 NOTE — TELEPHONE ENCOUNTER
Spoke to wife and she states patient takes 3 tablets at once as needed for erectile dysfunction I will notify pharmacy

## 2024-07-26 ENCOUNTER — APPOINTMENT (OUTPATIENT)
Dept: RADIOLOGY | Facility: HOSPITAL | Age: 64
End: 2024-07-26
Payer: COMMERCIAL

## 2024-07-26 ENCOUNTER — HOSPITAL ENCOUNTER (EMERGENCY)
Facility: HOSPITAL | Age: 64
Discharge: HOME | End: 2024-07-26
Attending: STUDENT IN AN ORGANIZED HEALTH CARE EDUCATION/TRAINING PROGRAM
Payer: COMMERCIAL

## 2024-07-26 VITALS
TEMPERATURE: 97.9 F | DIASTOLIC BLOOD PRESSURE: 98 MMHG | RESPIRATION RATE: 15 BRPM | HEART RATE: 72 BPM | WEIGHT: 250 LBS | SYSTOLIC BLOOD PRESSURE: 151 MMHG | BODY MASS INDEX: 32.08 KG/M2 | HEIGHT: 74 IN | OXYGEN SATURATION: 97 %

## 2024-07-26 DIAGNOSIS — S09.90XA HEAD INJURY, INITIAL ENCOUNTER: ICD-10-CM
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
## 2024-07-26 DIAGNOSIS — M25.572 ACUTE LEFT ANKLE PAIN: ICD-10-CM

## 2024-07-26 DIAGNOSIS — M25.562 ACUTE PAIN OF LEFT KNEE: ICD-10-CM

## 2024-07-26 DIAGNOSIS — M25.552 PAIN OF LEFT HIP: ICD-10-CM

## 2024-07-26 PROCEDURE — 73610 X-RAY EXAM OF ANKLE: CPT | Mod: LEFT SIDE | Performed by: RADIOLOGY

## 2024-07-26 PROCEDURE — 73610 X-RAY EXAM OF ANKLE: CPT | Mod: LT

## 2024-07-26 PROCEDURE — 72125 CT NECK SPINE W/O DYE: CPT | Performed by: STUDENT IN AN ORGANIZED HEALTH CARE EDUCATION/TRAINING PROGRAM

## 2024-07-26 PROCEDURE — 99285 EMERGENCY DEPT VISIT HI MDM: CPT | Mod: 25

## 2024-07-26 PROCEDURE — 73564 X-RAY EXAM KNEE 4 OR MORE: CPT | Mod: LEFT SIDE | Performed by: RADIOLOGY

## 2024-07-26 PROCEDURE — 73564 X-RAY EXAM KNEE 4 OR MORE: CPT | Mod: LT

## 2024-07-26 PROCEDURE — 73030 X-RAY EXAM OF SHOULDER: CPT | Mod: LEFT SIDE | Performed by: RADIOLOGY

## 2024-07-26 PROCEDURE — 99284 EMERGENCY DEPT VISIT MOD MDM: CPT | Performed by: STUDENT IN AN ORGANIZED HEALTH CARE EDUCATION/TRAINING PROGRAM

## 2024-07-26 PROCEDURE — 70450 CT HEAD/BRAIN W/O DYE: CPT

## 2024-07-26 PROCEDURE — 73502 X-RAY EXAM HIP UNI 2-3 VIEWS: CPT | Mod: LT

## 2024-07-26 PROCEDURE — 73502 X-RAY EXAM HIP UNI 2-3 VIEWS: CPT | Mod: LEFT SIDE | Performed by: RADIOLOGY

## 2024-07-26 PROCEDURE — 70450 CT HEAD/BRAIN W/O DYE: CPT | Performed by: STUDENT IN AN ORGANIZED HEALTH CARE EDUCATION/TRAINING PROGRAM

## 2024-07-26 PROCEDURE — 72125 CT NECK SPINE W/O DYE: CPT

## 2024-07-26 PROCEDURE — 73030 X-RAY EXAM OF SHOULDER: CPT | Mod: LT

## 2024-07-26 RX ORDER — ACETAMINOPHEN 325 MG/1
975 TABLET ORAL ONCE
Status: DISCONTINUED | OUTPATIENT
Start: 2024-07-26 | End: 2024-07-26 | Stop reason: HOSPADM

## 2024-07-26 ASSESSMENT — COLUMBIA-SUICIDE SEVERITY RATING SCALE - C-SSRS
2. HAVE YOU ACTUALLY HAD ANY THOUGHTS OF KILLING YOURSELF?: NO
1. IN THE PAST MONTH, HAVE YOU WISHED YOU WERE DEAD OR WISHED YOU COULD GO TO SLEEP AND NOT WAKE UP?: NO
6. HAVE YOU EVER DONE ANYTHING, STARTED TO DO ANYTHING, OR PREPARED TO DO ANYTHING TO END YOUR LIFE?: NO

## 2024-07-26 ASSESSMENT — LIFESTYLE VARIABLES
HAVE YOU EVER FELT YOU SHOULD CUT DOWN ON YOUR DRINKING: NO
TOTAL SCORE: 0
EVER FELT BAD OR GUILTY ABOUT YOUR DRINKING: NO
HAVE PEOPLE ANNOYED YOU BY CRITICIZING YOUR DRINKING: NO
EVER HAD A DRINK FIRST THING IN THE MORNING TO STEADY YOUR NERVES TO GET RID OF A HANGOVER: NO

## 2024-07-26 ASSESSMENT — PAIN - FUNCTIONAL ASSESSMENT: PAIN_FUNCTIONAL_ASSESSMENT: 0-10

## 2024-07-26 ASSESSMENT — PAIN SCALES - GENERAL
PAINLEVEL_OUTOF10: 9
PAINLEVEL_OUTOF10: 4

## 2024-07-26 ASSESSMENT — PAIN DESCRIPTION - LOCATION: LOCATION: ANKLE

## 2024-07-26 ASSESSMENT — PAIN DESCRIPTION - ORIENTATION: ORIENTATION: LEFT

## 2024-07-26 ASSESSMENT — PAIN DESCRIPTION - PAIN TYPE: TYPE: ACUTE PAIN

## 2024-07-26 NOTE — ED PROVIDER NOTES
EMERGENCY DEPARTMENT ENCOUNTER      Pt Name: Robel Mcneil  MRN: [id number]  Birthdate 1960  Date of evaluation: 7/26/2024  Provider: Tristen Millan DO    CHIEF COMPLAINT       Chief Complaint   Patient presents with    Fall     Pt states two days ago he fell down 6 steps at work and landed on the left side of his body - he landed on concrete and the left side of his head hit a door. Pt ambulating with steady gait. Complains of left sided shoulder, hip, and ankle pain. Pt states he takes aspirin daily. No LOC.          HISTORY OF PRESENT ILLNESS    Patient is a 64-year-old male with past medical history of diabetes, hypertension, hyperlipidemia, presenting today with a chief complaint of a fall.  This happened 2 days ago at work, fell down 6 steps landing on the left side of his body, states he the left side of his head on the door.  Also complaining of left shoulder, left hip, left knee, left ankle pain.  Left ankle somewhat swollen.  Has been able to ambulate and bear weight on the left lower extremity since.  Due to this being a Workmen's Compensation issue patient decided come to the emergency department today for evaluation.  Patient did not lose consciousness.  No neck pain.  He does not use anticoagulation.  Has had no headaches, vision changes, vomiting.  Patient said no chest pain, abdominal pain.  He still is full range of motion of all joints and has not noticed any change in sensation or color of the extremities.  Denies any preceding symptoms such as shortness of breath, lightheadedness or chest pain prior to the fall.  States that he tripped over a step and the fall was mechanical in nature.          Nursing Notes were reviewed.    PAST MEDICAL HISTORY     Past Medical History:   Diagnosis Date    Hyperlipidemia, unspecified     Dyslipidemia    Obesity, unspecified 05/25/2022    Class 2 obesity with body mass index (BMI) of 35.0 to 35.9 in adult    Other conditions influencing health status      Coronary Artery Disease    Other diseases of mediastinum, not elsewhere classified 05/02/2014    Mediastinal mass    Peripheral vascular angioplasty status 08/18/2013    Status post angioplasty    Personal history of other endocrine, nutritional and metabolic disease 08/07/2020    History of diabetes mellitus    Toxic effect of venom of brown recluse spider, accidental (unintentional), initial encounter 05/25/2022    Brown recluse spider bite         SURGICAL HISTORY       Past Surgical History:   Procedure Laterality Date    KNEE SURGERY  09/19/2013    Knee Surgery    OTHER SURGICAL HISTORY  07/02/2021    Colonoscopy    OTHER SURGICAL HISTORY  01/19/2021    Cardiac catheterization with stent placement    OTHER SURGICAL HISTORY  01/19/2021    Mass excision         CURRENT MEDICATIONS       Previous Medications    ASPIRIN 81 MG EC TABLET    Take 1 tablet (81 mg) by mouth once daily.    ATORVASTATIN (LIPITOR) 80 MG TABLET    Take 1 tablet (80 mg) by mouth once daily.    CHOLECALCIFEROL (VITAMIN D-3) 25 MCG (1000 UT) TABLET    Take 2 tablets (2,000 Units) by mouth once daily.    CYANOCOBALAMIN (VITAMIN B-12) 1,000 MCG TABLET    Take 1 tablet (1,000 mcg) by mouth once daily.    METFORMIN  MG 24 HR TABLET    TAKE 1 TABLET BY MOUTH ONCE DAILY AS DIRECTED    SEMAGLUTIDE (OZEMPIC) 1 MG/DOSE (4 MG/3 ML) PEN INJECTOR    Inject 1 mg under the skin 1 (one) time per week.    SILDENAFIL (REVATIO) 20 MG TABLET    Take 1 tablet (20 mg) by mouth see administration instructions.    TAMSULOSIN (FLOMAX) 0.4 MG 24 HR CAPSULE    Take 1 capsule by mouth at bedtime    VERAPAMIL SR (CALAN-SR) 120 MG ER TABLET    Take 1 tablet (120 mg) by mouth once daily in the morning. Take before meals.       ALLERGIES     Canagliflozin and Nsaids (non-steroidal anti-inflammatory drug)    FAMILY HISTORY       Family History   Problem Relation Name Age of Onset    Coronary artery disease Mother      Lung cancer Father      Hypertension Father       COPD Father            SOCIAL HISTORY       Social History     Socioeconomic History    Marital status:    Tobacco Use    Smoking status: Former     Types: Cigarettes    Smokeless tobacco: Never   Substance and Sexual Activity    Alcohol use: Yes     Alcohol/week: 2.0 standard drinks of alcohol     Types: 2 Standard drinks or equivalent per week    Drug use: Never       SCREENINGS                        PHYSICAL EXAM    (up to 7 for level 4, 8 or more for level 5)     ED Triage Vitals [07/26/24 1517]   Temperature Heart Rate Respirations BP   36.6 °C (97.9 °F) 87 18 177/90      Pulse Ox Temp Source Heart Rate Source Patient Position   98 % Temporal Monitor Sitting      BP Location FiO2 (%)     Right arm --       Physical Exam  Vitals and nursing note reviewed.   Constitutional:       General: He is not in acute distress.     Appearance: Normal appearance. He is not ill-appearing or toxic-appearing.   HENT:      Head: Normocephalic and atraumatic.      Comments: No raccoon eyes, Vickers sign, otorrhea or rhinorrhea.  No nasal septal hematoma or hemotympanum.  Midface stable.  No dental trauma or malalignment of the jaw.     Right Ear: External ear normal.      Left Ear: External ear normal.      Nose: Nose normal.   Eyes:      General:         Right eye: No discharge.         Left eye: No discharge.      Extraocular Movements: Extraocular movements intact.      Conjunctiva/sclera: Conjunctivae normal.      Pupils: Pupils are equal, round, and reactive to light.   Neck:      Comments: No midline C-spine, T-spine, L-spine step-off deformity or tenderness.  Cardiovascular:      Rate and Rhythm: Normal rate and regular rhythm.      Pulses: Normal pulses.      Heart sounds: Normal heart sounds.   Pulmonary:      Effort: Pulmonary effort is normal.      Breath sounds: Normal breath sounds.   Abdominal:      General: There is no distension.      Palpations: Abdomen is soft. There is no mass.      Tenderness: There is  no abdominal tenderness. There is no guarding.   Musculoskeletal:         General: No deformity or signs of injury.      Comments: Full range of motion of all joints.  No bony tenderness.  No crepitus.  2+ dorsalis pedis, posterior tibial, radial pulses.  All compartments are soft.  Full flexion and extension and strength in all joints in all extremities.   Skin:     General: Skin is warm and dry.   Neurological:      General: No focal deficit present.      Mental Status: He is alert and oriented to person, place, and time. Mental status is at baseline.   Psychiatric:         Mood and Affect: Mood normal.         Behavior: Behavior normal.          DIAGNOSTIC RESULTS     LABS:  Labs Reviewed - No data to display    All other labs were within normal range or not returned as of this dictation.    Imaging  CT head wo IV contrast   Final Result   Brain:   No acute intracranial hemorrhage, mass effect, or CT apparent acute   infarct. Chronic microvascular ischemia involutional changes.        Cervical spine:   No acute fracture or traumatic malalignment.   Degenerative changes most prominent at C5-C6 and C6-C7.             Signed by: Alexander Carrera 7/26/2024 6:16 PM   Dictation workstation:   [id number]      CT cervical spine wo IV contrast   Final Result   Brain:   No acute intracranial hemorrhage, mass effect, or CT apparent acute   infarct. Chronic microvascular ischemia involutional changes.        Cervical spine:   No acute fracture or traumatic malalignment.   Degenerative changes most prominent at C5-C6 and C6-C7.             Signed by: Alexander Carrera 7/26/2024 6:16 PM   Dictation workstation:   [id number]      XR shoulder left 2+ views   Final Result   No acute bony abnormality left shoulder.             MACRO:   None        Signed by: Juanita Blevins 7/26/2024 4:16 PM   Dictation workstation:   [id number]      XR hip left with pelvis when performed 2 or 3 views   Final Result   No acute bony abnormality  left hip..        Signed by: Juanita Blevins 7/26/2024 4:16 PM   Dictation workstation:   [id number]      XR knee left 4+ views   Final Result   No acute bony abnormality left knee.             MACRO:   None        Signed by: Juanita Blevins 7/26/2024 4:15 PM   Dictation workstation:   [id number]      XR ankle left 3+ views   Final Result   Lateral malleolar soft tissue swelling with no acute bony abnormality.             MACRO:   None        Signed by: Juanita Blevins 7/26/2024 4:15 PM   Dictation workstation:   [id number]           Procedures  Procedures     EMERGENCY DEPARTMENT COURSE/MDM:   Medical Decision Making  64-year-old male presenting after he fell down around 6 steps while at work 2 days ago.  States he was walking down the steps when the second step he attempted to step on gave way causing him to land on his left side and hit the left side of his head on the door.  Patient did not lose consciousness.  No nausea vomiting or other concerning symptoms afterwards.  Complaining of left shoulder, left hip, left knee, left ankle pain.  Left ankle somewhat swollen, otherwise there are no abnormalities on physical exam regarding the left shoulder, hip, knee.  Range of motion is normal in the shoulder.  No obvious deformities anywhere.  Given that the patient fell down 6 steps, we are unable to use [place] CT head rule to rule out need for CT.  CT head was ordered.  CT C-spine was also ordered.  Will obtain x-rays of the shoulder, hip, ankle, knee to evaluate for acute injuries.  No need for further lab evaluation at this time.        Please see ED course for additional MDM.    ED Course as of 07/26/24 1823   Fri Jul 26, 2024   1618 X-ray of ankle with lateral malleolar soft tissue swelling with no acute bony abnormalities.  X-ray of the knee with no acute bony abnormalities.  No acute findings.  X-ray of the pelvis and left hip with no acute bony abnormalities noted.  X-ray of the shoulder is negative for  any acute abnormalities as well. [CL]   1822 CT head negative for acute abnormalities.  No acute fractures or bony injuries to the C-spine [CL]   1823 No acute findings ordered today.  Patient's Workmen's Compensation paperwork was filled out.  Patient was given return precautions.  Results were discussed with him.  Patient is agreement plan for discharge.  He was discharged stable condition. [CL]      ED Course User Index  [CL] Tristen Millan DO         Diagnoses as of 07/26/24 1823   Fall, initial encounter   Head injury, initial encounter   Acute left ankle pain   Acute pain of left knee   Pain of left hip        Patient and or family in agreement and understanding of treatment plan.  All questions answered.      I reviewed the case with the attending ED physician. The attending ED physician agrees with the plan. Patient and/or patient´s representative was counseled regarding labs, imaging, likely diagnosis, and plan. All questions were answered.    ED Medications administered this visit:    Medications   acetaminophen (Tylenol) tablet 975 mg (975 mg oral Not Given 7/26/24 1630)       New Prescriptions from this visit:    New Prescriptions    No medications on file       Follow-up:  Paulo Fragoso MD  [address]  [address]  [facility] [id number]  [phone]    Schedule an appointment as soon as possible for a visit           Final Impression:   1. Fall, initial encounter    2. Head injury, initial encounter    3. Acute left ankle pain    4. Acute pain of left knee    5. Pain of left hip          (Please note that portions of this note were completed with a voice recognition program.  Efforts were made to edit the dictations but occasionally words are mis-transcribed.)     Tristen Millan DO  Resident  07/26/24 1823      I performed a history and physical examination of Robel Mcneil and discussed his management with Dr. Millan.  I agree with the history, physical, assessment, and plan of care, with  the following exceptions: None    I was present for the following procedures: None  Time Spent in Critical Care of the patient: None  Time spent in discussions with the patient and family: 25    DO Art Brady DO  07/27/24 1341

## 2024-07-26 NOTE — DISCHARGE INSTRUCTIONS
Seek immediate medical attention if you develop: worsening headache, nausea, vomiting, confusion, weakness, loss of motion in your arms or legs, loss of control of your urine or stool, difficulty waking from sleep, neck pain, fever, or any new or worsening symptoms.    Please manage her symptoms at home with either Tylenol and or ibuprofen.  Please follow-up with Dr. Fragoso

## 2024-08-16 ENCOUNTER — APPOINTMENT (OUTPATIENT)
Dept: PRIMARY CARE | Facility: CLINIC | Age: 64
End: 2024-08-16
Payer: COMMERCIAL

## 2024-08-17 ENCOUNTER — HOSPITAL ENCOUNTER (OUTPATIENT)
Dept: RADIOLOGY | Facility: HOSPITAL | Age: 64
Discharge: HOME | End: 2024-08-17
Payer: COMMERCIAL

## 2024-08-17 DIAGNOSIS — R17 ELEVATED BILIRUBIN: ICD-10-CM

## 2024-08-17 PROCEDURE — 76705 ECHO EXAM OF ABDOMEN: CPT

## 2024-08-17 PROCEDURE — 76705 ECHO EXAM OF ABDOMEN: CPT | Performed by: RADIOLOGY

## 2024-08-20 DIAGNOSIS — K76.0 FATTY LIVER: ICD-10-CM

## 2024-08-20 DIAGNOSIS — N20.0 KIDNEY STONE: ICD-10-CM

## 2024-09-13 ENCOUNTER — APPOINTMENT (OUTPATIENT)
Dept: GASTROENTEROLOGY | Facility: CLINIC | Age: 64
End: 2024-09-13
Payer: COMMERCIAL

## 2024-10-03 ENCOUNTER — APPOINTMENT (OUTPATIENT)
Dept: UROLOGY | Facility: CLINIC | Age: 64
End: 2024-10-03
Payer: COMMERCIAL

## 2024-10-03 VITALS
DIASTOLIC BLOOD PRESSURE: 76 MMHG | HEART RATE: 81 BPM | HEIGHT: 74 IN | BODY MASS INDEX: 32.82 KG/M2 | RESPIRATION RATE: 16 BRPM | WEIGHT: 255.73 LBS | SYSTOLIC BLOOD PRESSURE: 118 MMHG

## 2024-10-03 DIAGNOSIS — N20.0 KIDNEY STONE: ICD-10-CM

## 2024-10-03 PROCEDURE — 99214 OFFICE O/P EST MOD 30 MIN: CPT | Performed by: UROLOGY

## 2024-10-03 PROCEDURE — 1160F RVW MEDS BY RX/DR IN RCRD: CPT | Performed by: UROLOGY

## 2024-10-03 PROCEDURE — G2211 COMPLEX E/M VISIT ADD ON: HCPCS | Performed by: UROLOGY

## 2024-10-03 PROCEDURE — 99204 OFFICE O/P NEW MOD 45 MIN: CPT | Performed by: UROLOGY

## 2024-10-03 PROCEDURE — 1159F MED LIST DOCD IN RCRD: CPT | Performed by: UROLOGY

## 2024-10-03 PROCEDURE — 3074F SYST BP LT 130 MM HG: CPT | Performed by: UROLOGY

## 2024-10-03 PROCEDURE — 1036F TOBACCO NON-USER: CPT | Performed by: UROLOGY

## 2024-10-03 PROCEDURE — 3078F DIAST BP <80 MM HG: CPT | Performed by: UROLOGY

## 2024-10-03 PROCEDURE — 3008F BODY MASS INDEX DOCD: CPT | Performed by: UROLOGY

## 2024-10-03 PROCEDURE — 1125F AMNT PAIN NOTED PAIN PRSNT: CPT | Performed by: UROLOGY

## 2024-10-03 ASSESSMENT — PAIN SCALES - GENERAL: PAINLEVEL: 2

## 2024-10-03 NOTE — PROGRESS NOTES
History Of Present Illness  64-year-old male seeing me for kidney stone  PMH: CAD prior PCI, hypertension, hyperlipidemia, type 2 diabetes, BPH and erectile dysfunction  Creatinine 0.91  PSA 1.28    Renal bladder ultrasound 8/17/2024: Possible 1.6 cm stone right upper pole (question whether this is millimeter)    Pt reports left lower back/hip pain. Pain started months ago. No hematuria. No UTIs.     Prior stone episode 4-5y ago.     Past Medical History  He has a past medical history of Hyperlipidemia, unspecified, Obesity, unspecified (05/25/2022), Other conditions influencing health status, Other diseases of mediastinum, not elsewhere classified (05/02/2014), Peripheral vascular angioplasty status (08/18/2013), Personal history of other endocrine, nutritional and metabolic disease (08/07/2020), and Toxic effect of venom of brown recluse spider, accidental (unintentional), initial encounter (05/25/2022).    Surgical History  He has a past surgical history that includes Other surgical history (07/02/2021); Other surgical history (01/19/2021); Other surgical history (01/19/2021); and Knee surgery (09/19/2013).     Social History  He reports that he has quit smoking. His smoking use included cigarettes. He has never used smokeless tobacco. He reports current alcohol use of about 2.0 standard drinks of alcohol per week. He reports that he does not use drugs.    Family History  Family History   Problem Relation Name Age of Onset    Coronary artery disease Mother      Lung cancer Father      Hypertension Father      COPD Father          Allergies  Canagliflozin and Nsaids (non-steroidal anti-inflammatory drug)    ROS: 12 system review was completed and is negative with the exception of those signs and symptoms noted in the history of present illness: A 12 system review was completed and is negative with the exception of those signs and symptoms noted in the history of present illness.     Exam:  General: in NAD, appears  "stated age  Head: normocephalic, atraumatic  Respiratory: normal effort, no use of accessory muscles  Cardiovascular: no edema noted  Skin: normal turgor, no rashes  Neurologic: grossly intact, oriented to person/place/time  Psychiatric: mode and affect appropriate     Last Recorded Vitals  Blood pressure 118/76, pulse 81, resp. rate 16, height 1.867 m (6' 1.5\"), weight 116 kg (255 lb 11.7 oz).    Lab Results   Component Value Date    PSASCREEN 1.28 12/18/2023    CREATININE 0.91 12/18/2023    HGB 14.4 12/18/2023         ASSESSMENT/PLAN:  # 1.6 cm right-sided nephrolithiasis  -CT stone for better characterization of his kidney stone  -We discussed 3 surgical options for nephrolithiasis including ESWL, ureteroscopy, and PCNL.  We weighed the pros and cons of each option  -He is starting a new job, will not be able to have surgery for at least 90 days  -I think this is perfectly reasonable, currently is asymptomatic  -His left-sided pain is unlikely to be from a kidney stone  - follow-up after CT to discuss results    Mac Nolan MD      "

## 2024-10-04 ENCOUNTER — APPOINTMENT (OUTPATIENT)
Dept: PRIMARY CARE | Facility: CLINIC | Age: 64
End: 2024-10-04
Payer: COMMERCIAL

## 2024-10-10 ENCOUNTER — APPOINTMENT (OUTPATIENT)
Dept: UROLOGY | Facility: CLINIC | Age: 64
End: 2024-10-10
Payer: COMMERCIAL

## 2024-10-11 ENCOUNTER — APPOINTMENT (OUTPATIENT)
Dept: PRIMARY CARE | Facility: CLINIC | Age: 64
End: 2024-10-11
Payer: COMMERCIAL

## 2024-10-11 ENCOUNTER — HOSPITAL ENCOUNTER (OUTPATIENT)
Dept: RADIOLOGY | Facility: CLINIC | Age: 64
Discharge: HOME | End: 2024-10-11
Payer: COMMERCIAL

## 2024-10-11 VITALS
SYSTOLIC BLOOD PRESSURE: 144 MMHG | WEIGHT: 257 LBS | OXYGEN SATURATION: 96 % | BODY MASS INDEX: 34.06 KG/M2 | TEMPERATURE: 98.2 F | HEIGHT: 73 IN | HEART RATE: 84 BPM | DIASTOLIC BLOOD PRESSURE: 81 MMHG

## 2024-10-11 DIAGNOSIS — E32.8 THYMIC CYST (MULTI): ICD-10-CM

## 2024-10-11 DIAGNOSIS — M79.605 PAIN OF LEFT LOWER EXTREMITY: ICD-10-CM

## 2024-10-11 DIAGNOSIS — G89.29 CHRONIC LOW BACK PAIN WITH LEFT-SIDED SCIATICA, UNSPECIFIED BACK PAIN LATERALITY: ICD-10-CM

## 2024-10-11 DIAGNOSIS — I10 PRIMARY HYPERTENSION: ICD-10-CM

## 2024-10-11 DIAGNOSIS — Z00.00 WELL ADULT EXAM: Primary | ICD-10-CM

## 2024-10-11 DIAGNOSIS — M54.42 CHRONIC LOW BACK PAIN WITH LEFT-SIDED SCIATICA, UNSPECIFIED BACK PAIN LATERALITY: ICD-10-CM

## 2024-10-11 DIAGNOSIS — E11.9 TYPE 2 DIABETES MELLITUS WITHOUT COMPLICATION, WITHOUT LONG-TERM CURRENT USE OF INSULIN (MULTI): ICD-10-CM

## 2024-10-11 DIAGNOSIS — E78.2 MIXED HYPERLIPIDEMIA: ICD-10-CM

## 2024-10-11 PROBLEM — E66.01 SEVERE OBESITY (MULTI): Status: RESOLVED | Noted: 2024-01-08 | Resolved: 2024-10-11

## 2024-10-11 PROBLEM — U07.1 DISEASE DUE TO SEVERE ACUTE RESPIRATORY SYNDROME CORONAVIRUS 2 (SARS-COV-2): Status: RESOLVED | Noted: 2023-06-19 | Resolved: 2024-10-11

## 2024-10-11 PROBLEM — F19.21 HISTORY OF SUBSTANCE DEPENDENCE (MULTI): Status: RESOLVED | Noted: 2023-03-31 | Resolved: 2024-10-11

## 2024-10-11 PROCEDURE — 73502 X-RAY EXAM HIP UNI 2-3 VIEWS: CPT | Mod: LT

## 2024-10-11 PROCEDURE — 99397 PER PM REEVAL EST PAT 65+ YR: CPT | Performed by: INTERNAL MEDICINE

## 2024-10-11 PROCEDURE — 90471 IMMUNIZATION ADMIN: CPT | Performed by: INTERNAL MEDICINE

## 2024-10-11 PROCEDURE — 3077F SYST BP >= 140 MM HG: CPT | Performed by: INTERNAL MEDICINE

## 2024-10-11 PROCEDURE — 1123F ACP DISCUSS/DSCN MKR DOCD: CPT | Performed by: INTERNAL MEDICINE

## 2024-10-11 PROCEDURE — 1159F MED LIST DOCD IN RCRD: CPT | Performed by: INTERNAL MEDICINE

## 2024-10-11 PROCEDURE — 3079F DIAST BP 80-89 MM HG: CPT | Performed by: INTERNAL MEDICINE

## 2024-10-11 PROCEDURE — 90656 IIV3 VACC NO PRSV 0.5 ML IM: CPT | Performed by: INTERNAL MEDICINE

## 2024-10-11 PROCEDURE — 72100 X-RAY EXAM L-S SPINE 2/3 VWS: CPT

## 2024-10-11 PROCEDURE — 3008F BODY MASS INDEX DOCD: CPT | Performed by: INTERNAL MEDICINE

## 2024-10-11 PROCEDURE — 73590 X-RAY EXAM OF LOWER LEG: CPT | Mod: LT

## 2024-10-11 PROCEDURE — 1036F TOBACCO NON-USER: CPT | Performed by: INTERNAL MEDICINE

## 2024-10-11 PROCEDURE — 99214 OFFICE O/P EST MOD 30 MIN: CPT | Performed by: INTERNAL MEDICINE

## 2024-10-11 ASSESSMENT — PATIENT HEALTH QUESTIONNAIRE - PHQ9
10. IF YOU CHECKED OFF ANY PROBLEMS, HOW DIFFICULT HAVE THESE PROBLEMS MADE IT FOR YOU TO DO YOUR WORK, TAKE CARE OF THINGS AT HOME, OR GET ALONG WITH OTHER PEOPLE: SOMEWHAT DIFFICULT
SUM OF ALL RESPONSES TO PHQ9 QUESTIONS 1 AND 2: 2
2. FEELING DOWN, DEPRESSED OR HOPELESS: SEVERAL DAYS
1. LITTLE INTEREST OR PLEASURE IN DOING THINGS: SEVERAL DAYS

## 2024-10-11 NOTE — PROGRESS NOTES
"Subjective       Current Issues:  Current concerns include doing ok  Fired from job  .large kidney stone   Saw urology  Will repeat ct  Cardiology 6/2024  No pain  Almost at 1 mg w semaglutide was nauseated now doing ok   Back pain not always assoc w leg pain   Leg pain hurts when walking  No edema  Sometimes ankle hurts  Sleep: all night  No bowel or bladder issues  No cp or sob or depression    Review of Nutrition:  Current diet: discussed   Exercise discussed    Gen:  no fever  HEENT:  no trouble swallowing  CV:  no dyspnea, cyanosis  Lungs:  no shortness of breath  GI:  no constipation, no blood in stool  Vascular:  no edema  Neuro:   no weakness  Skin:  no rash  MS:no joint swelling  Gu:  no urinary complaints  All other systems have been reviewed and are negative for complaint      Screening Questions:  Objective   /81   Pulse 84   Temp 36.8 °C (98.2 °F) (Temporal)   Ht 1.861 m (6' 1.25\")   Wt 117 kg (257 lb)   SpO2 96%   BMI 33.68 kg/m²       General:   alert and oriented, in no acute distress   Gait:   normal   Skin:   normal   Oral cavity:   lips, mucosa, and tongue normal; teeth and gums normal   Eyes:   sclerae white, pupils equal and reactive   Ears:   normal bilaterally Tms grey   Neck:   no adenopathy and thyroid not enlarged, symmetric, no tenderness/mass/nodules   Lungs:  clear to auscultation bilaterally   Heart:   regular rate and rhythm, S1, S2 normal, no murmur, click, rub or gallop   Abdomen:  soft, non-tender; bowel sounds normal; no masses, no organomegaly   : ne       Extremities:  extremities normal, warm and well-perfused; no cyanosis, clubbing, or edema,   Neuro:  normal without focal findings and muscle tone and strength normal and symmetric       Robel was seen today for annual exam.  Diagnoses and all orders for this visit:  Well adult exam (Primary)  -     Hemoglobin A1C; Future  -     Comprehensive Metabolic Panel; Future  -     TSH with reflex to Free T4 if abnormal; " Future  -     Vitamin D 25-Hydroxy,Total (for eval of Vitamin D levels); Future  -     Vitamin B12; Future  -     Lipid Panel; Future  -     CBC and Auto Differential; Future  Mixed hyperlipidemia  -     Hemoglobin A1C; Future  -     Comprehensive Metabolic Panel; Future  -     TSH with reflex to Free T4 if abnormal; Future  -     Vitamin D 25-Hydroxy,Total (for eval of Vitamin D levels); Future  -     Vitamin B12; Future  -     Lipid Panel; Future  -     CBC and Auto Differential; Future  Primary hypertension  -     Hemoglobin A1C; Future  -     Comprehensive Metabolic Panel; Future  -     TSH with reflex to Free T4 if abnormal; Future  -     Vitamin D 25-Hydroxy,Total (for eval of Vitamin D levels); Future  -     Vitamin B12; Future  -     Lipid Panel; Future  -     CBC and Auto Differential; Future  Type 2 diabetes mellitus without complication, without long-term current use of insulin (Multi)  -     Hemoglobin A1C; Future  -     Comprehensive Metabolic Panel; Future  -     TSH with reflex to Free T4 if abnormal; Future  -     Vitamin D 25-Hydroxy,Total (for eval of Vitamin D levels); Future  -     Vitamin B12; Future  -     Lipid Panel; Future  -     CBC and Auto Differential; Future  Pain of left lower extremity  -     XR tibia fibula left 2 views; Future  -     Cancel: Referral to Physical Therapy; Future  Chronic low back pain with left-sided sciatica, unspecified back pain laterality  -     XR lumbar spine 2-3 views; Future  -     XR tibia fibula left 2 views; Future  -     Referral to Physical Therapy; Future  -     Cancel: Referral to Physical Therapy; Future  Thymic cyst (Multi)  Other orders  -     Flu vaccine, trivalent, preservative free, age 6 months and greater (Fluarix/Fluzone/Flulaval)     Chronic conditions reviewed in the assessment and plan.    Continue medications unless specified otherwise.  Previous labs reviewed.   Other specialty provider notes reviewed.    Follow up in 3 months or prn.

## 2024-10-14 ENCOUNTER — TELEPHONE (OUTPATIENT)
Dept: PRIMARY CARE | Facility: CLINIC | Age: 64
End: 2024-10-14

## 2024-10-14 NOTE — TELEPHONE ENCOUNTER
----- Message from Claudia Casey sent at 10/14/2024 12:02 PM EDT -----  Already done just need notes  ----- Message -----  From: Maxine Mike MA  Sent: 10/14/2024  10:25 AM EDT  To: Claudia Casey MD    Is he already on workmans comp or trying to open a claim?  ----- Message -----  From: Claudia Casey MD  Sent: 10/11/2024   1:27 PM EDT  To: #    Need workmans comp in [facility]

## 2024-10-19 ENCOUNTER — APPOINTMENT (OUTPATIENT)
Dept: RADIOLOGY | Facility: CLINIC | Age: 64
End: 2024-10-19
Payer: COMMERCIAL

## 2024-12-07 ENCOUNTER — LAB (OUTPATIENT)
Dept: LAB | Facility: LAB | Age: 64
End: 2024-12-07
Payer: COMMERCIAL

## 2024-12-07 DIAGNOSIS — R79.89 ELEVATED LFTS: ICD-10-CM

## 2024-12-07 DIAGNOSIS — E55.9 VITAMIN D DEFICIENCY: ICD-10-CM

## 2024-12-07 DIAGNOSIS — Z00.00 WELL ADULT EXAM: ICD-10-CM

## 2024-12-07 DIAGNOSIS — E78.2 MIXED HYPERLIPIDEMIA: ICD-10-CM

## 2024-12-07 DIAGNOSIS — E11.9 TYPE 2 DIABETES MELLITUS WITHOUT COMPLICATION, WITHOUT LONG-TERM CURRENT USE OF INSULIN (MULTI): ICD-10-CM

## 2024-12-07 DIAGNOSIS — I10 PRIMARY HYPERTENSION: ICD-10-CM

## 2024-12-07 LAB
25(OH)D3 SERPL-MCNC: 39 NG/ML (ref 30–100)
ALBUMIN SERPL BCP-MCNC: 4.4 G/DL (ref 3.4–5)
ALP SERPL-CCNC: 100 U/L (ref 33–136)
ALT SERPL W P-5'-P-CCNC: 10 U/L (ref 10–52)
ANION GAP SERPL CALC-SCNC: 11 MMOL/L (ref 10–20)
AST SERPL W P-5'-P-CCNC: 17 U/L (ref 9–39)
BASOPHILS # BLD AUTO: 0.04 X10*3/UL (ref 0–0.1)
BASOPHILS NFR BLD AUTO: 0.6 %
BILIRUB SERPL-MCNC: 1.9 MG/DL (ref 0–1.2)
BUN SERPL-MCNC: 11 MG/DL (ref 6–23)
CALCIUM SERPL-MCNC: 9.5 MG/DL (ref 8.6–10.3)
CHLORIDE SERPL-SCNC: 105 MMOL/L (ref 98–107)
CHOLEST SERPL-MCNC: 140 MG/DL (ref 0–199)
CHOLESTEROL/HDL RATIO: 4
CO2 SERPL-SCNC: 28 MMOL/L (ref 21–32)
CREAT SERPL-MCNC: 0.94 MG/DL (ref 0.5–1.3)
EGFRCR SERPLBLD CKD-EPI 2021: 90 ML/MIN/1.73M*2
EOSINOPHIL # BLD AUTO: 0.11 X10*3/UL (ref 0–0.7)
EOSINOPHIL NFR BLD AUTO: 1.7 %
ERYTHROCYTE [DISTWIDTH] IN BLOOD BY AUTOMATED COUNT: 13.2 % (ref 11.5–14.5)
EST. AVERAGE GLUCOSE BLD GHB EST-MCNC: 111 MG/DL
GLUCOSE SERPL-MCNC: 110 MG/DL (ref 74–99)
HBA1C MFR BLD: 5.5 %
HCT VFR BLD AUTO: 42.5 % (ref 41–52)
HDLC SERPL-MCNC: 34.7 MG/DL
HGB BLD-MCNC: 14.6 G/DL (ref 13.5–17.5)
IMM GRANULOCYTES # BLD AUTO: 0.02 X10*3/UL (ref 0–0.7)
IMM GRANULOCYTES NFR BLD AUTO: 0.3 % (ref 0–0.9)
LDLC SERPL CALC-MCNC: 90 MG/DL
LYMPHOCYTES # BLD AUTO: 1.75 X10*3/UL (ref 1.2–4.8)
LYMPHOCYTES NFR BLD AUTO: 26.5 %
MCH RBC QN AUTO: 29.7 PG (ref 26–34)
MCHC RBC AUTO-ENTMCNC: 34.4 G/DL (ref 32–36)
MCV RBC AUTO: 87 FL (ref 80–100)
MONOCYTES # BLD AUTO: 0.55 X10*3/UL (ref 0.1–1)
MONOCYTES NFR BLD AUTO: 8.3 %
NEUTROPHILS # BLD AUTO: 4.13 X10*3/UL (ref 1.2–7.7)
NEUTROPHILS NFR BLD AUTO: 62.6 %
NON HDL CHOLESTEROL: 105 MG/DL (ref 0–149)
NRBC BLD-RTO: 0 /100 WBCS (ref 0–0)
PLATELET # BLD AUTO: 205 X10*3/UL (ref 150–450)
POTASSIUM SERPL-SCNC: 4.8 MMOL/L (ref 3.5–5.3)
PROT SERPL-MCNC: 6.9 G/DL (ref 6.4–8.2)
RBC # BLD AUTO: 4.91 X10*6/UL (ref 4.5–5.9)
SODIUM SERPL-SCNC: 139 MMOL/L (ref 136–145)
TRIGL SERPL-MCNC: 79 MG/DL (ref 0–149)
TSH SERPL-ACNC: 3.47 MIU/L (ref 0.44–3.98)
VIT B12 SERPL-MCNC: 780 PG/ML (ref 211–911)
VLDL: 16 MG/DL (ref 0–40)
WBC # BLD AUTO: 6.6 X10*3/UL (ref 4.4–11.3)

## 2024-12-07 PROCEDURE — 36415 COLL VENOUS BLD VENIPUNCTURE: CPT

## 2024-12-09 DIAGNOSIS — R79.89 ELEVATED LFTS: Primary | ICD-10-CM

## 2024-12-09 LAB
BILIRUB DIRECT SERPL-MCNC: 0.3 MG/DL (ref 0–0.3)
LIPASE SERPL-CCNC: 41 U/L (ref 9–82)

## 2025-01-17 ENCOUNTER — APPOINTMENT (OUTPATIENT)
Dept: PRIMARY CARE | Facility: CLINIC | Age: 65
End: 2025-01-17
Payer: COMMERCIAL

## 2025-01-17 VITALS
DIASTOLIC BLOOD PRESSURE: 70 MMHG | BODY MASS INDEX: 32.76 KG/M2 | TEMPERATURE: 97.8 F | WEIGHT: 250 LBS | HEART RATE: 84 BPM | OXYGEN SATURATION: 95 % | SYSTOLIC BLOOD PRESSURE: 160 MMHG

## 2025-01-17 DIAGNOSIS — K76.0 FATTY LIVER: ICD-10-CM

## 2025-01-17 DIAGNOSIS — E11.9 TYPE 2 DIABETES MELLITUS WITHOUT COMPLICATION, WITHOUT LONG-TERM CURRENT USE OF INSULIN (MULTI): Primary | ICD-10-CM

## 2025-01-17 DIAGNOSIS — G25.2 INTENTION TREMOR: ICD-10-CM

## 2025-01-17 DIAGNOSIS — M79.605 PAIN OF LEFT LOWER EXTREMITY: ICD-10-CM

## 2025-01-17 DIAGNOSIS — I10 PRIMARY HYPERTENSION: ICD-10-CM

## 2025-01-17 DIAGNOSIS — R21 RASH: ICD-10-CM

## 2025-01-17 DIAGNOSIS — E78.5 HYPERLIPIDEMIA, UNSPECIFIED HYPERLIPIDEMIA TYPE: ICD-10-CM

## 2025-01-17 PROCEDURE — 3078F DIAST BP <80 MM HG: CPT | Performed by: INTERNAL MEDICINE

## 2025-01-17 PROCEDURE — 3077F SYST BP >= 140 MM HG: CPT | Performed by: INTERNAL MEDICINE

## 2025-01-17 PROCEDURE — 1123F ACP DISCUSS/DSCN MKR DOCD: CPT | Performed by: INTERNAL MEDICINE

## 2025-01-17 PROCEDURE — 99214 OFFICE O/P EST MOD 30 MIN: CPT | Performed by: INTERNAL MEDICINE

## 2025-01-17 PROCEDURE — 1159F MED LIST DOCD IN RCRD: CPT | Performed by: INTERNAL MEDICINE

## 2025-01-17 RX ORDER — TRIAMCINOLONE ACETONIDE 1 MG/G
CREAM TOPICAL 2 TIMES DAILY
Qty: 28.4 G | Refills: 0 | Status: SHIPPED | OUTPATIENT
Start: 2025-01-17

## 2025-01-17 RX ORDER — METFORMIN HYDROCHLORIDE 500 MG/1
500 TABLET, EXTENDED RELEASE ORAL DAILY
Qty: 90 TABLET | Refills: 3 | Status: SHIPPED | OUTPATIENT
Start: 2025-01-17

## 2025-01-17 RX ORDER — ATORVASTATIN CALCIUM 80 MG/1
80 TABLET, FILM COATED ORAL DAILY
Qty: 90 TABLET | Refills: 3 | Status: SHIPPED | OUTPATIENT
Start: 2025-01-17

## 2025-01-17 RX ORDER — HYDROCHLOROTHIAZIDE 25 MG/1
120 TABLET ORAL
Qty: 90 TABLET | Refills: 3 | Status: SHIPPED | OUTPATIENT
Start: 2025-01-17

## 2025-01-17 NOTE — PROGRESS NOTES
Subjective   Patient ID: Robel Mcneil is a 64 y.o. male who presents for Follow-up.  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  Sciatica better-back pain is better  Rash on chest mildly itchy  Leg pain w walking has to rest for it to stop  Skips metformin on sunday  Review of Systems  Gen:  no fever  HEENT:  no trouble swallowing  CV:  no dyspnea, cyanosis  Lungs:  no shortness of breath  GI:  no constipation, no blood in stool  Vascular:  no edema  Neuro:   no weakness   MS:no joint swelling  Gu:  no urinary complaints  All other systems have been reviewed and are negative for complaint    /70   Pulse 84   Temp 36.6 °C (97.8 °F)   Wt 113 kg (250 lb)   SpO2 95%   BMI 32.76 kg/m²   Objective   Physical Exam  Lab Results   Component Value Date    WBC 6.6 12/07/2024    HGB 14.6 12/07/2024    HCT 42.5 12/07/2024    MCV 87 12/07/2024     12/07/2024     Lab Results   Component Value Date    GLUCOSE 110 (H) 12/07/2024    CALCIUM 9.5 12/07/2024     12/07/2024    K 4.8 12/07/2024    CO2 28 12/07/2024     12/07/2024    BUN 11 12/07/2024    CREATININE 0.94 12/07/2024     Social History     Socioeconomic History    Marital status:    Tobacco Use    Smoking status: Former     Types: Cigarettes    Smokeless tobacco: Never   Substance and Sexual Activity    Alcohol use: Not Currently     Comment: rare    Drug use: Never     Family History   Problem Relation Name Age of Onset    Coronary artery disease Mother      Lung cancer Father      Hypertension Father      COPD Father         General:  Alert and in  NAD  Lungs, CTAB  Skin:  no suspicious lesions,  warm and dry  Head :  Normocephalic  Neck/thyroid:  neck supple, full rom, no cervical lymphadenopathy  no thyromegaly  Heart:  RRR  no murmurs  Abdomen:  Normal , bs present, soft, nontender, not  distended, no masses palpated  Extremities:  No clubbing, cyanosis, or edema  Nl bl le pulse  Neurologic:  Nonfocal nl dtrs , nl strength  Intention tremor not noted today, but described w l hand only when eating    Psych: alert, normal mood   Problem List Items Addressed This Visit       Hypertension    Relevant Medications    verapamil SR (Calan-SR) 120 mg ER tablet    Hyperlipidemia    Relevant Medications    atorvastatin (Lipitor) 80 mg tablet    Type 2 diabetes mellitus - Primary    Relevant Medications    metFORMIN  mg 24 hr tablet     Other Visit Diagnoses       Fatty liver        Relevant Orders    US right upper quadrant    Pain of left lower extremity        Relevant Orders    Vascular US ankle brachial index (STEPH) without exercise    Referral to Neurology    Rash        Relevant Medications    triamcinolone (Kenalog) 0.1 % cream    Intention tremor                 Could dec ozempic or metformin, pt wants to cont on same dose  Suspect neurogenic claudication  Unlikely pad with nl pulse  For intention tremor, see neuro as well as for above  Chronic conditions reviewed in the assessment and plan.    Continue medications unless specified otherwise.  Previous labs reviewed.   Other specialty provider notes reviewed.   Follow up in 3 months or prn.  Will update repeat bp done in office

## 2025-02-08 ENCOUNTER — HOSPITAL ENCOUNTER (OUTPATIENT)
Dept: RADIOLOGY | Facility: HOSPITAL | Age: 65
Discharge: HOME | End: 2025-02-08
Payer: COMMERCIAL

## 2025-02-08 ENCOUNTER — HOSPITAL ENCOUNTER (OUTPATIENT)
Dept: CARDIOLOGY | Facility: HOSPITAL | Age: 65
Discharge: HOME | End: 2025-02-08
Payer: COMMERCIAL

## 2025-02-08 DIAGNOSIS — K76.0 FATTY LIVER: ICD-10-CM

## 2025-02-08 DIAGNOSIS — I73.9 PERIPHERAL VASCULAR DISEASE, UNSPECIFIED (CMS-HCC): ICD-10-CM

## 2025-02-08 DIAGNOSIS — M79.605 PAIN OF LEFT LOWER EXTREMITY: ICD-10-CM

## 2025-02-08 PROCEDURE — 93922 UPR/L XTREMITY ART 2 LEVELS: CPT | Performed by: INTERNAL MEDICINE

## 2025-02-08 PROCEDURE — 76705 ECHO EXAM OF ABDOMEN: CPT

## 2025-02-08 PROCEDURE — 93922 UPR/L XTREMITY ART 2 LEVELS: CPT

## 2025-02-08 PROCEDURE — 76705 ECHO EXAM OF ABDOMEN: CPT | Performed by: STUDENT IN AN ORGANIZED HEALTH CARE EDUCATION/TRAINING PROGRAM

## 2025-02-10 DIAGNOSIS — N20.0 KIDNEY STONE: ICD-10-CM

## 2025-02-13 DIAGNOSIS — I73.9 PERIPHERAL VASCULAR DISEASE (CMS-HCC): ICD-10-CM

## 2025-02-24 DIAGNOSIS — N20.0 KIDNEY STONE: ICD-10-CM

## 2025-05-05 DIAGNOSIS — E55.9 VITAMIN D DEFICIENCY: ICD-10-CM

## 2025-05-05 DIAGNOSIS — E11.9 TYPE 2 DIABETES MELLITUS WITHOUT COMPLICATION, WITHOUT LONG-TERM CURRENT USE OF INSULIN: ICD-10-CM

## 2025-05-05 DIAGNOSIS — E78.2 MIXED HYPERLIPIDEMIA: ICD-10-CM

## 2025-05-05 RX ORDER — SEMAGLUTIDE 1.34 MG/ML
INJECTION, SOLUTION SUBCUTANEOUS
Qty: 3 ML | Refills: 11 | Status: SHIPPED | OUTPATIENT
Start: 2025-05-05

## 2025-05-05 NOTE — TELEPHONE ENCOUNTER
"Refill Request      Last OV with PCP 1/17/2025     Future Appointments       Date / Time Provider Department Dept Phone    6/13/2025 4:00 PM Tracy M Schwab, APRN-CNP [facility] [phone]    7/11/2025 4:30 PM (Arrive by 4:15 PM) Claudia Casey MD  [facility] [phone]          Lab Results   Component Value Date    HGBA1C 5.5 12/07/2024     Lab Results   Component Value Date    CHOL 140 12/07/2024    CHOL 121 12/18/2023    CHOL 83 11/15/2022     Lab Results   Component Value Date    HDL 34.7 12/07/2024    HDL 34.3 12/18/2023    HDL 32.0 (A) 11/15/2022     Lab Results   Component Value Date    LDLCALC 90 12/07/2024    LDLCALC 71 12/18/2023     Lab Results   Component Value Date    TRIG 79 12/07/2024    TRIG 79 12/18/2023    TRIG 51 11/15/2022     No components found for: \"CHOLHDL\"  Lab Results   Component Value Date    TSH 3.47 12/07/2024     Lab Results   Component Value Date    GLUCOSE 110 (H) 12/07/2024    CALCIUM 9.5 12/07/2024     12/07/2024    K 4.8 12/07/2024    CO2 28 12/07/2024     12/07/2024    BUN 11 12/07/2024    CREATININE 0.94 12/07/2024       "

## 2025-05-12 DIAGNOSIS — E11.9 TYPE 2 DIABETES MELLITUS WITHOUT COMPLICATION, WITHOUT LONG-TERM CURRENT USE OF INSULIN: ICD-10-CM

## 2025-05-21 DIAGNOSIS — N52.9 ERECTILE DYSFUNCTION, UNSPECIFIED ERECTILE DYSFUNCTION TYPE: ICD-10-CM

## 2025-05-21 RX ORDER — SILDENAFIL CITRATE 20 MG/1
20 TABLET ORAL SEE ADMIN INSTRUCTIONS
Qty: 40 TABLET | Refills: 0 | Status: SHIPPED | OUTPATIENT
Start: 2025-05-21

## 2025-05-21 NOTE — TELEPHONE ENCOUNTER
"Refill Request      Last OV with PCP 1/17/2025     Future Appointments       Date / Time Provider Department Dept Phone    6/13/2025 4:00 PM Tracy M Schwab, APRN-CNP [facility] [phone]    7/11/2025 4:30 PM (Arrive by 4:15 PM) Claudia Casey MD  [facility] [phone]          Lab Results   Component Value Date    HGBA1C 5.5 12/07/2024     Lab Results   Component Value Date    CHOL 140 12/07/2024    CHOL 121 12/18/2023    CHOL 83 11/15/2022     Lab Results   Component Value Date    HDL 34.7 12/07/2024    HDL 34.3 12/18/2023    HDL 32.0 (A) 11/15/2022     Lab Results   Component Value Date    LDLCALC 90 12/07/2024    LDLCALC 71 12/18/2023     Lab Results   Component Value Date    TRIG 79 12/07/2024    TRIG 79 12/18/2023    TRIG 51 11/15/2022     No components found for: \"CHOLHDL\"  Lab Results   Component Value Date    TSH 3.47 12/07/2024     Lab Results   Component Value Date    GLUCOSE 110 (H) 12/07/2024    CALCIUM 9.5 12/07/2024     12/07/2024    K 4.8 12/07/2024    CO2 28 12/07/2024     12/07/2024    BUN 11 12/07/2024    CREATININE 0.94 12/07/2024     VITD25   Date/Time Value Ref Range Status   12/07/2024 09:06 AM 39 30 - 100 ng/mL Final         "

## 2025-06-13 ENCOUNTER — APPOINTMENT (OUTPATIENT)
Dept: CARDIOLOGY | Facility: CLINIC | Age: 65
End: 2025-06-13
Payer: COMMERCIAL

## 2025-06-13 VITALS
RESPIRATION RATE: 18 BRPM | BODY MASS INDEX: 32.6 KG/M2 | OXYGEN SATURATION: 98 % | SYSTOLIC BLOOD PRESSURE: 140 MMHG | HEIGHT: 73 IN | WEIGHT: 246 LBS | DIASTOLIC BLOOD PRESSURE: 80 MMHG | HEART RATE: 81 BPM

## 2025-06-13 DIAGNOSIS — I25.10 CAD S/P PERCUTANEOUS CORONARY ANGIOPLASTY: Primary | ICD-10-CM

## 2025-06-13 DIAGNOSIS — E78.2 MIXED HYPERLIPIDEMIA: ICD-10-CM

## 2025-06-13 DIAGNOSIS — I10 PRIMARY HYPERTENSION: ICD-10-CM

## 2025-06-13 DIAGNOSIS — Z98.61 CAD S/P PERCUTANEOUS CORONARY ANGIOPLASTY: Primary | ICD-10-CM

## 2025-06-13 PROCEDURE — 93000 ELECTROCARDIOGRAM COMPLETE: CPT | Performed by: NURSE PRACTITIONER

## 2025-06-13 PROCEDURE — 3008F BODY MASS INDEX DOCD: CPT | Performed by: NURSE PRACTITIONER

## 2025-06-13 PROCEDURE — 3079F DIAST BP 80-89 MM HG: CPT | Performed by: NURSE PRACTITIONER

## 2025-06-13 PROCEDURE — 99214 OFFICE O/P EST MOD 30 MIN: CPT | Performed by: NURSE PRACTITIONER

## 2025-06-13 PROCEDURE — 3077F SYST BP >= 140 MM HG: CPT | Performed by: NURSE PRACTITIONER

## 2025-06-13 PROCEDURE — 1036F TOBACCO NON-USER: CPT | Performed by: NURSE PRACTITIONER

## 2025-06-13 PROCEDURE — 1159F MED LIST DOCD IN RCRD: CPT | Performed by: NURSE PRACTITIONER

## 2025-06-13 NOTE — PROGRESS NOTES
Name : Robel Mcneil   : 1960   MRN : [id number]   ENC Date : 2025    CC: Annual Exam, Hypertension, Hyperlipidemia, and Coronary Artery Disease     HPI:    Robel Mcneil is a 65 y.o. male with PMHx sig for CAD s/p PCI LAD & RI with AILEEN (. repeat cor angio 2014 patent stents), LVEF 60-65% (echo ), HTN, HLD, obesity & DM who presents with his Wife today for annual cardiovascular follow up.     He has done very well since his last visit. Reports no major health issues and has had no hospitalizations.     New this year, the Wife tells me that he stopped taking his statin. Which is reflective in his LDL bump to 90. Statin in combo with his verapamil was causing dizziness. Advised by PCP to take 2hrs apart, but he's not taking it.    Denies any chest pain, pressure, SOB/CORREA, PND, orthopnea, LE edema, palpitations, lightheadedness, dizziness, or syncope at rest or with exertions. He is compliant with his medications and reports no side effects.     Golfs every      He is a  for [employer]. 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 No e/o ischemia nor infarct    ROS: unless otherwise noted in the history of present illness, all other systems were reviewed and they are negative for complaints     Allergies:  Canagliflozin and Nsaids (non-steroidal anti-inflammatory drug)    Current Outpatient Medications   Medication Instructions    aspirin 81 mg, Daily    atorvastatin (LIPITOR) 80 mg, oral, Daily    cholecalciferol (VITAMIN D-3) 2,000 Units, Daily    cyanocobalamin (Vitamin B-12) 1,000 mcg tablet 1 tablet, Daily    metFORMIN XR (GLUCOPHAGE-XR) 500 mg, oral, Daily, as directed    semaglutide (Ozempic) 1 mg/dose (4 mg/3 mL) pen injector INJECT 1 MG UNDER THE SKIN ONE TIME PER WEEK.    sildenafil (REVATIO) 20 mg, oral, See admin instructions    tamsulosin (FLOMAX) 0.4 mg, oral, Nightly  "   triamcinolone (Kenalog) 0.1 % cream Topical, 2 times daily, Apply to affected area 1-2 times daily as needed.    verapamil SR (CALAN-SR) 120 mg, oral, Daily before breakfast        Last Labs:  CBC  Lab Results   Component Value Date    WBC 6.6 12/07/2024    HGB 14.6 12/07/2024    HCT 42.5 12/07/2024    MCV 87 12/07/2024     12/07/2024       CMP  Lab Results   Component Value Date    CALCIUM 9.5 12/07/2024    PROT 6.9 12/07/2024    ALBUMIN 4.4 12/07/2024    AST 17 12/07/2024    ALT 10 12/07/2024    ALKPHOS 100 12/07/2024    BILITOT 1.9 (H) 12/07/2024       BMP   Lab Results   Component Value Date     12/07/2024    K 4.8 12/07/2024     12/07/2024    CO2 28 12/07/2024    GLUCOSE 110 (H) 12/07/2024    BUN 11 12/07/2024    CREATININE 0.94 12/07/2024       LIPID PANEL   Lab Results   Component Value Date    CHOL 140 12/07/2024    TRIG 79 12/07/2024    HDL 34.7 12/07/2024    CHHDL 4.0 12/07/2024    LDLF 41 11/15/2022    VLDL 16 12/07/2024    NHDL 105 12/07/2024       RENAL FUNCTION PANEL   Lab Results   Component Value Date    GLUCOSE 110 (H) 12/07/2024     12/07/2024    K 4.8 12/07/2024     12/07/2024    CO2 28 12/07/2024    ANIONGAP 11 12/07/2024    BUN 11 12/07/2024    CREATININE 0.94 12/07/2024    GFRMALE 81 03/17/2023    CALCIUM 9.5 12/07/2024    ALBUMIN 4.4 12/07/2024        Lab Results   Component Value Date    HGBA1C 5.5 12/07/2024     I have reviewed the above labs & diagnostics    Last Recorded Vitals:  Vitals:    06/13/25 1506   BP: 140/80   BP Location: Left arm   Patient Position: Sitting   Pulse: 81   Resp: 18   SpO2: 98%   Weight: 112 kg (246 lb)   Height: 1.854 m (6' 1\")     Physical Exam:  On exam Mr. Robel Mcneil appears his stated age, is alert and oriented x3, and in no acute distress. His sclera are anicteric and his oropharynx has moist mucous membranes. His neck is supple and without thyromegaly. The JVP is ~5 cm of water above the right atrium. His cardiac " exam has regular rhythm, normal S1, S2. No S3/4. There are no murmurs. His lungs are clear to auscultation bilaterally and there is no dullness to percussion. His abdomen is soft, nontender with normoactive bowel sounds. There is no HJR. The extremities are warm and without edema. The skin is dry. There is no rash present. The distal pulses are 2-3+ in all four extremities. His mood and affect are appropriate for todays encounter.     Assessment/Plan:  1. CAD s/p PCI LAD & RI with AILEEN (2012). Repeat coronary angiography 2014 showed patent stents. LVEF 60-65% (echo 2020). 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 Hypertension. /80 mmHg today. Need to monitor. If he remains on the elevated side then will start anti-HTN.     3. HLD. Stopped his statin. LDL 90. Restart statin. Repeat lipid panel in 6 months to 1 year    Will follow up with Robel to review echo results once complete    Tracy M Schwab, APRN-CNP

## 2025-06-13 NOTE — PATIENT INSTRUCTIONS
"- PLEASE RESTART YOUR ATORVASTATIN (LIPITOR). The reason being is that your pipes have \"cloggage\" aka coronary artery disease. We do not want heart attacks & more stents or open heart surgery down the road. Thus, again, restart your statin. Goal LDL for you is < 70  - Follow up in 1 year or sooner as needed     It was so nice to see you! As always, I look forward to being your cardiac Nurse Practitioner. I am a huge believer in communicating with my patients. Please contact me at any time, if anything is not clear to you regarding anything we have discussed, or if new questions occur to you.    "

## 2025-06-13 NOTE — LETTER
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    If you have any questions or concerns, please don't hesitate to call.         Sincerely,        Tracy M Schwab, APRN-CNP    CC: No Recipients

## 2025-06-27 ENCOUNTER — APPOINTMENT (OUTPATIENT)
Dept: CARDIOLOGY | Facility: CLINIC | Age: 65
End: 2025-06-27
Payer: COMMERCIAL

## 2025-07-02 DIAGNOSIS — E11.9 TYPE 2 DIABETES MELLITUS WITHOUT COMPLICATION, WITHOUT LONG-TERM CURRENT USE OF INSULIN: ICD-10-CM

## 2025-07-03 ENCOUNTER — TELEPHONE (OUTPATIENT)
Dept: CARDIOLOGY | Facility: CLINIC | Age: 65
End: 2025-07-03
Payer: COMMERCIAL

## 2025-07-03 NOTE — TELEPHONE ENCOUNTER
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     The letter needs to state that the patient is in stable condition to drive trucks. All of his testing are normal including the LVEF and EKG. 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     Will you like for me to do the letter?

## 2025-07-04 LAB
EST. AVERAGE GLUCOSE BLD GHB EST-MCNC: 114 MG/DL
EST. AVERAGE GLUCOSE BLD GHB EST-SCNC: 6.3 MMOL/L
HBA1C MFR BLD: 5.6 %

## 2025-07-07 DIAGNOSIS — E11.9 TYPE 2 DIABETES MELLITUS WITHOUT COMPLICATION, WITHOUT LONG-TERM CURRENT USE OF INSULIN: ICD-10-CM

## 2025-07-07 DIAGNOSIS — I10 PRIMARY HYPERTENSION: ICD-10-CM

## 2025-07-07 DIAGNOSIS — E78.2 MIXED HYPERLIPIDEMIA: ICD-10-CM

## 2025-07-11 ENCOUNTER — APPOINTMENT (OUTPATIENT)
Dept: PRIMARY CARE | Facility: CLINIC | Age: 65
End: 2025-07-11
Payer: COMMERCIAL

## 2025-07-12 DIAGNOSIS — N40.0 BPH WITHOUT OBSTRUCTION/LOWER URINARY TRACT SYMPTOMS: ICD-10-CM

## 2025-07-15 RX ORDER — TAMSULOSIN HYDROCHLORIDE 0.4 MG/1
0.4 CAPSULE ORAL NIGHTLY
Qty: 90 CAPSULE | Refills: 2 | Status: SHIPPED | OUTPATIENT
Start: 2025-07-15

## 2025-07-15 NOTE — TELEPHONE ENCOUNTER
"Refill Request     Requested Prescriptions     Pending Prescriptions Disp Refills    tamsulosin (Flomax) 0.4 mg 24 hr capsule [Pharmacy Med Name: Tamsulosin HCl 0.4 MG Oral Capsule] 90 capsule 2     Sig: Take 1 capsule by mouth at bedtime          Last OV with PCP 1/17/2025     Future Appointments       Date / Time Provider Department Dept Phone    8/22/2025 2:00 PM (Arrive by 1:45 PM) Claudia Casey MD  [facility] [phone]    6/3/2026 9:00 AM [facility] STRESS LAB [facility] [phone]    6/3/2026 10:00 AM [facility] ECHO LAB 1 [facility] [place] [phone]    6/16/2026 4:00 PM Tracy M Schwab, APRN-CNP [facility] [phone]          Lab Results   Component Value Date    HGBA1C 5.6 07/03/2025     Lab Results   Component Value Date    CHOL 140 12/07/2024    CHOL 121 12/18/2023    CHOL 83 11/15/2022     Lab Results   Component Value Date    HDL 34.7 12/07/2024    HDL 34.3 12/18/2023    HDL 32.0 (A) 11/15/2022     Lab Results   Component Value Date    LDLCALC 90 12/07/2024    LDLCALC 71 12/18/2023     Lab Results   Component Value Date    TRIG 79 12/07/2024    TRIG 79 12/18/2023    TRIG 51 11/15/2022     No components found for: \"CHOLHDL\"  Lab Results   Component Value Date    TSH 3.47 12/07/2024     Lab Results   Component Value Date    GLUCOSE 110 (H) 12/07/2024    CALCIUM 9.5 12/07/2024     12/07/2024    K 4.8 12/07/2024    CO2 28 12/07/2024     12/07/2024    BUN 11 12/07/2024    CREATININE 0.94 12/07/2024     VITD25   Date/Time Value Ref Range Status   12/07/2024 09:06 AM 39 30 - 100 ng/mL Final         "

## 2025-08-22 ENCOUNTER — APPOINTMENT (OUTPATIENT)
Dept: PRIMARY CARE | Facility: CLINIC | Age: 65
End: 2025-08-22
Payer: COMMERCIAL

## 2026-06-16 ENCOUNTER — APPOINTMENT (OUTPATIENT)
Dept: CARDIOLOGY | Facility: CLINIC | Age: 66
End: 2026-06-16
Payer: COMMERCIAL